# Patient Record
Sex: MALE | Race: BLACK OR AFRICAN AMERICAN | Employment: FULL TIME | URBAN - METROPOLITAN AREA
[De-identification: names, ages, dates, MRNs, and addresses within clinical notes are randomized per-mention and may not be internally consistent; named-entity substitution may affect disease eponyms.]

---

## 2017-03-02 PROBLEM — D12.6 COLON ADENOMA: Status: ACTIVE | Noted: 2017-03-02

## 2017-06-08 RX ORDER — BENAZEPRIL HYDROCHLORIDE 5 MG/1
TABLET ORAL
Qty: 90 TAB | Refills: 3 | Status: SHIPPED | OUTPATIENT
Start: 2017-06-08 | End: 2017-06-13 | Stop reason: SDUPTHER

## 2017-06-08 RX ORDER — DILTIAZEM HYDROCHLORIDE 180 MG/1
CAPSULE, EXTENDED RELEASE ORAL
Qty: 90 CAP | Refills: 3 | Status: SHIPPED | OUTPATIENT
Start: 2017-06-08 | End: 2017-06-13 | Stop reason: SDUPTHER

## 2017-06-12 RX ORDER — NAPROXEN 500 MG/1
500 TABLET ORAL 2 TIMES DAILY WITH MEALS
Qty: 90 TAB | Refills: 3 | Status: SHIPPED | OUTPATIENT
Start: 2017-06-12

## 2017-06-13 ENCOUNTER — TELEPHONE (OUTPATIENT)
Dept: INTERNAL MEDICINE CLINIC | Age: 62
End: 2017-06-13

## 2017-06-13 RX ORDER — LISINOPRIL 5 MG/1
5 TABLET ORAL DAILY
Qty: 90 TAB | Refills: 3
Start: 2017-06-13 | End: 2018-06-12 | Stop reason: SDUPTHER

## 2017-06-13 RX ORDER — DILTIAZEM HYDROCHLORIDE 180 MG/1
CAPSULE, EXTENDED RELEASE ORAL
Qty: 90 CAP | Refills: 3 | Status: SHIPPED | OUTPATIENT
Start: 2017-06-13 | End: 2018-06-12 | Stop reason: SDUPTHER

## 2017-06-13 RX ORDER — BENAZEPRIL HYDROCHLORIDE 5 MG/1
TABLET ORAL
Qty: 90 TAB | Refills: 3 | Status: SHIPPED | OUTPATIENT
Start: 2017-06-13 | End: 2017-06-13 | Stop reason: CLARIF

## 2017-06-13 NOTE — TELEPHONE ENCOUNTER
Mrs. Sandra Juarez is calling stating [employer] did not receive the Diltiazem or the Benazepril. She wants us to print those so [place].     [phone]

## 2017-10-24 ENCOUNTER — APPOINTMENT (OUTPATIENT)
Dept: GENERAL RADIOLOGY | Age: 62
End: 2017-10-24
Attending: EMERGENCY MEDICINE
Payer: COMMERCIAL

## 2017-10-24 ENCOUNTER — HOSPITAL ENCOUNTER (EMERGENCY)
Age: 62
Discharge: HOME | End: 2017-10-24
Attending: EMERGENCY MEDICINE | Admitting: EMERGENCY MEDICINE
Payer: COMMERCIAL

## 2017-10-24 VITALS
RESPIRATION RATE: 14 BRPM | TEMPERATURE: 98.1 F | HEART RATE: 57 BPM | BODY MASS INDEX: 24.33 KG/M2 | WEIGHT: 155 LBS | OXYGEN SATURATION: 100 % | DIASTOLIC BLOOD PRESSURE: 79 MMHG | SYSTOLIC BLOOD PRESSURE: 141 MMHG | HEIGHT: 67 IN

## 2017-10-24 DIAGNOSIS — J20.9 ACUTE BRONCHITIS, UNSPECIFIED ORGANISM: Primary | ICD-10-CM

## 2017-10-24 PROCEDURE — 99282 EMERGENCY DEPT VISIT SF MDM: CPT

## 2017-10-24 PROCEDURE — 71020 XR CHEST PA LAT: CPT

## 2017-10-24 RX ORDER — DOXYCYCLINE 100 MG/1
100 CAPSULE ORAL 2 TIMES DAILY
Qty: 10 CAP | Refills: 0 | Status: SHIPPED | OUTPATIENT
Start: 2017-10-24 | End: 2017-10-29

## 2017-10-24 RX ORDER — BENZONATATE 100 MG/1
100 CAPSULE ORAL
Qty: 10 CAP | Refills: 0 | Status: SHIPPED | OUTPATIENT
Start: 2017-10-24 | End: 2018-12-18

## 2017-10-24 NOTE — DISCHARGE INSTRUCTIONS
IF YOU HAVE NEW OR WORSENING SYMPTOMS, TROUBLE BREATHING, HIGH FEVER, OR ANY OTHER WORRYING SIGNS THEN RETURN TO THE ER RIGHT AWAY. Bronchitis: Care Instructions  Your Care Instructions    Bronchitis is inflammation of the bronchial tubes, which carry air to the lungs. The tubes swell and produce mucus, or phlegm. The mucus and inflamed bronchial tubes make you cough. You may have trouble breathing. Most cases of bronchitis are caused by viruses like those that cause colds. Antibiotics usually do not help and they may be harmful. Bronchitis usually develops rapidly and lasts about 2 to 3 weeks in otherwise healthy people. Follow-up care is a key part of your treatment and safety. Be sure to make and go to all appointments, and call your doctor if you are having problems. It's also a good idea to know your test results and keep a list of the medicines you take. How can you care for yourself at home? · Take all medicines exactly as prescribed. Call your doctor if you think you are having a problem with your medicine. · Get some extra rest.  · Take an over-the-counter pain medicine, such as acetaminophen (Tylenol), ibuprofen (Advil, Motrin), or naproxen (Aleve) to reduce fever and relieve body aches. Read and follow all instructions on the label. · Do not take two or more pain medicines at the same time unless the doctor told you to. Many pain medicines have acetaminophen, which is Tylenol. Too much acetaminophen (Tylenol) can be harmful. · Take an over-the-counter cough medicine that contains dextromethorphan to help quiet a dry, hacking cough so that you can sleep. Avoid cough medicines that have more than one active ingredient. Read and follow all instructions on the label. · Breathe moist air from a humidifier, hot shower, or sink filled with hot water. The heat and moisture will thin mucus so you can cough it out. · Do not smoke. Smoking can make bronchitis worse.  If you need help quitting, talk to your doctor about stop-smoking programs and medicines. These can increase your chances of quitting for good. When should you call for help? Call 911 anytime you think you may need emergency care. For example, call if:  · You have severe trouble breathing. Call your doctor now or seek immediate medical care if:  · You have new or worse trouble breathing. · You cough up dark brown or bloody mucus (sputum). · You have a new or higher fever. · You have a new rash. Watch closely for changes in your health, and be sure to contact your doctor if:  · You cough more deeply or more often, especially if you notice more mucus or a change in the color of your mucus. · You are not getting better as expected. Where can you learn more? Go to http://oscar-tristen.info/. Enter H333 in the search box to learn more about \"Bronchitis: Care Instructions. \"  Current as of: March 25, 2017  Content Version: 11.3  © 5765-2171 [employer]. Care instructions adapted under license by [employer] (which disclaims liability or warranty for this information). If you have questions about a medical condition or this instruction, always ask your healthcare professional. [address] any warranty or liability for your use of this information.

## 2017-10-24 NOTE — ED NOTES
Philip Cross is a 58 y.o. male that was discharged in stable condition. The patients diagnosis, condition and treatment were explained to  patient and aftercare instructions were given. The patient verbalized understanding. Patient armband removed and shredded.

## 2017-10-24 NOTE — ED NOTES
Hourly rounding completed:    Spouse at bedside. Patient resting comfortably awaiting xray results and further MD orders. Call bell within reach. No needs at this time.

## 2017-10-24 NOTE — ED PROVIDER NOTES
HPI Comments: 7:55 AM: Melani Adkins is a 58y.o. year old male with hx of HTN presenting to the ED with c/o productive cough for 2 weeks. Pt states having irritation of the back of his throat feeling like sand and coughing up brown mucus. Pt reports taking Coricidin PTA at home with no sx relief. Denies fever, chills, V/D, leg edema, or hx of asthma or emphysema. Pt states he usually gets sick this time of year with the weather change as he is a  and outside the majority of the day. Pt reported getting his flu shot and no sick contact. The history is provided by the patient. Past Medical History:   Diagnosis Date    Colon polyps 2006    Dr Michael Mahoney 2006; 2/17 adenoma/hyperplastic and divertics    ED (erectile dysfunction)     Hypertension     Prediabetes     Transaminasemia 2006    neg US and serologies       Past Surgical History:   Procedure Laterality Date    CHEST SURGERY PROCEDURE UNLISTED  12/06    US thyroid negative    HX COLONOSCOPY      Dr. Michael Mahoney 2006 polyps; 2/22/16 polyps and divertics         Family History:   Problem Relation Age of Onset    Cancer Father      lung cancer    Cancer Sister      lung cancer       Social History     Social History    Marital status: UNKNOWN     Spouse name: N/A    Number of children: N/A    Years of education: N/A     Occupational History    [facility] [facility]      Social History Main Topics    Smoking status: Former Smoker     Quit date: 1/1/1980    Smokeless tobacco: Never Used    Alcohol use Yes      Comment: social    Drug use: No    Sexual activity: Not on file     Other Topics Concern    Not on file     Social History Narrative         ALLERGIES: Cialis [tadalafil]; Levitra [vardenafil]; and Viagra [sildenafil]    Review of Systems   Constitutional: Negative for chills, fatigue and fever. HENT: Positive for sore throat (back of throat \"feels like sand\"). Negative for congestion and rhinorrhea.     Eyes: Negative for pain, redness, itching and visual disturbance. Respiratory: Positive for cough (productive with brown muscus). Negative for chest tightness, shortness of breath and wheezing. Cardiovascular: Negative for chest pain, palpitations and leg swelling. Gastrointestinal: Negative for abdominal pain, diarrhea, nausea and vomiting. Genitourinary: Negative for decreased urine volume, dysuria, flank pain and urgency. Musculoskeletal: Negative for arthralgias, back pain, gait problem and myalgias. Skin: Negative for color change, rash and wound. Allergic/Immunologic: Negative for environmental allergies, food allergies and immunocompromised state. Neurological: Negative for dizziness, weakness and headaches. Vitals:    10/24/17 0743   BP: 141/79   Pulse: (!) 57   Resp: 14   Temp: 98.1 °F (36.7 °C)   SpO2: 100%   Weight: 70.3 kg (155 lb)   Height: 5' 7\" (1.702 m)            Physical Exam   Constitutional: He appears well-developed and well-nourished. No distress. HENT:   Head: Normocephalic and atraumatic. Mouth/Throat: Oropharynx is clear and moist.   Eyes: Conjunctivae and EOM are normal. Pupils are equal, round, and reactive to light. Neck: Normal range of motion. Neck supple. Cardiovascular: Normal rate, regular rhythm and normal heart sounds. No murmur heard. Pulmonary/Chest: Effort normal. No accessory muscle usage. No tachypnea. No respiratory distress. He has no wheezes. He has rales in the left lower field. Abdominal: Soft. Bowel sounds are normal. He exhibits no distension. There is no tenderness. Musculoskeletal: Normal range of motion. He exhibits no edema or deformity. Lymphadenopathy:     He has no cervical adenopathy. Neurological: He is alert. He exhibits normal muscle tone. Coordination normal.   Skin: Skin is warm and dry. No rash noted. No erythema. Psychiatric: He has a normal mood and affect. His behavior is normal.   Nursing note and vitals reviewed.        [facility]  ED Course     Patient well appearing, oxygenating 100% on RA. No obvious infiltrate, but given rales on exam and duration of sx >2 weeks, will rx course of abx as well as antitussive. Counseled to f/u with primary care for re-eval or return if sx worsen    Procedures           Vitals:  Patient Vitals for the past 12 hrs:   Temp Pulse Resp BP SpO2   10/24/17 0743 98.1 °F (36.7 °C) (!) 57 14 141/79 100 %       X-ray, CT or radiology findings or impressions:  XR CHEST PA LAT    (Results Pending)   XR CHEST PA LAT  8:43 AM ED Physician Interpretation:  possible developing infiltrate at left heart border    Diagnosis:   1. Acute bronchitis, unspecified organism        Disposition: Discharge    Follow-up Information     Follow up With Details Comments 354 Key Saunders MD In 2 days Re-evaluation 100[address] [facility] [address]      [address] [facility] EMERGENCY DEPT  If symptoms worsen [address] [id number] [phone]           Patient's Medications   Start Taking    BENZONATATE (TESSALON PERLES) 100 MG CAPSULE    Take 1 Cap by mouth three (3) times daily as needed for Cough. DOXYCYCLINE (MONODOX) 100 MG CAPSULE    Take 1 Cap by mouth two (2) times a day for 5 days. Continue Taking    DILTIAZEM (TAZTIA XT) 180 MG SR CAPSULE    TAKE ONE CAPSULE BY MOUTH EVERY DAY    LISINOPRIL (PRINIVIL, ZESTRIL) 5 MG TABLET    Take 1 Tab by mouth daily. MULTIVITS/IRON FUM/FA/D3/LYCOP (MULTI FOR HIM PO)    Take  by mouth. NAPROXEN (NAPROSYN) 500 MG TABLET    Take 1 Tab by mouth two (2) times daily (with meals).    These Medications have changed    No medications on file   Stop Taking    No medications on file       Scribe [address] acting as a scribe for and in the presence of Shante Reece MD      October 24, 2017 at 8:02 AM       Provider Attestation:      I personally performed the services described in the documentation, reviewed the documentation, as recorded by the scribe in my presence, and it accurately and completely records my words and actions.  October 24, 2017 at 8:02 AM - Víctor Merida MD

## 2017-12-17 NOTE — PROGRESS NOTES
58 y.o. black male who presents for RPE. 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 Denies any GI or Gu issues. Denies polyuria, polydipsia, nocturia, vision change, not checking his sugars regularly. Weight stable as below     Vitals 12/21/2017 10/24/2017 12/8/2016 6/23/2016 12/22/2015   Weight 156 lb 155 lb 158 lb 156 lb 154 lb     The back issues continue to respond to p[rn naproxen, films as below.   No radicular complaints    He does want something for some sinus congestion    Past Medical History:   Diagnosis Date    Arthritis 12/2016    degen disc disease worst L4-S1    Colon polyps 2006    Dr Iman Kline 2006; 2/17 adenoma/hyperplastic and divertics    ED (erectile dysfunction)     Hypertension     Prediabetes     Transaminasemia 2006    neg US and serologies     Past Surgical History:   Procedure Laterality Date    CHEST SURGERY PROCEDURE UNLISTED  12/06     thyroid negative    HX COLONOSCOPY      Dr. Iman Kline 2006 polyps; 2/22/16 polyps and divertics     Social History     Social History    Marital status:      Spouse name: N/A    Number of children: N/A    Years of education: N/A     Occupational History    [employer]      Social History Main Topics    Smoking status: Former Smoker     Quit date: 1/1/1980    Smokeless tobacco: Never Used    Alcohol use Yes      Comment: social    Drug use: No    Sexual activity: Not on file     Other Topics Concern    Not on file     Social History Narrative     Family History   Problem Relation Age of Onset    Cancer Father      lung cancer    Cancer Sister      lung cancer     Immunization History   Administered Date(s) Administered    Influenza Vaccine 10/15/2013, 10/01/2015, 11/01/2017    Influenza Vaccine Nasal 10/01/2011    Influenza Vaccine Split 10/01/2012    Influenza Vaccine Whole 01/01/2009    TD Vaccine 01/01/2006    Zoster Vaccine, Live 01/01/2015     Allergies Allergen Reactions    Cialis [Tadalafil] Other (comments)     Head congestion    Levitra [Vardenafil] Other (comments)     Head congestion    Viagra [Sildenafil] Other (comments)     Head congestion     Current Outpatient Prescriptions   Medication Sig    fluticasone (FLONASE) 50 mcg/actuation nasal spray 2 sprays each nostril daily    benzonatate (TESSALON PERLES) 100 mg capsule Take 1 Cap by mouth three (3) times daily as needed for Cough.  dilTIAZem (TAZTIA XT) 180 mg SR capsule TAKE ONE CAPSULE BY MOUTH EVERY DAY    lisinopril (PRINIVIL, ZESTRIL) 5 mg tablet Take 1 Tab by mouth daily.  naproxen (NAPROSYN) 500 mg tablet Take 1 Tab by mouth two (2) times daily (with meals).  MULTIVITS/IRON FUM/FA/D3/LYCOP (MULTI FOR HIM PO) Take  by mouth. No current facility-administered medications for this visit.       REVIEW OF SYSTEMS: colo 2/16 Dr Michael Mahoney  Ophthlisa  no vision change or eye pain  Oral  no mouth pain, tongue or tooth problems  Ears  no hearing loss, ear pain, fullness, no swallowing problems  Cardiac  no CP, PND, orthopnea, edema, palpitations or syncope  Chest  no breast masses  Resp  no wheezing, chronic coughing, dyspnea  GI  no heartburn, nausea, vomiting, change in bowel habits, bleeding, hemorrhoids  Urinary  no dysuria, hematuria, flank pain, urgency, frequency  Genitals  no genital lesions, discharge, masses, ulceration, warts  Ortho  no swelling, dec ROM, myalgias  Derm  no nail abnormalities, rashes, lesions of note, hair loss  Psych  denies any anxiety or depression symptoms, no hallucinations or violent ideation  Constitutional  no wt loss, night sweats, unexplained fevers  Neuro  no focal weakness, numbness, paresthesias, incoordination, ataxia, involuntary movements  Endo - no polyuria, polydipsia, nocturia, hot flashes    Visit Vitals    /74 (BP 1 Location: Right arm, BP Patient Position: Sitting)    Pulse (!) 58    Temp 98.9 °F (37.2 °C) (Oral)    Resp 14    Ht 5' 7\" (1.702 m)    Wt 156 lb (70.8 kg)    SpO2 100%    BMI 24.43 kg/m2   A&O x3  Affect normal and appropriate  HEENT --Ancteric sclerae, tympanic membranes normal, sinuses were nontender, OP benign. No thyromegaly, JVD, or bruits. PERRL, EOMI  Lungs --Clear to auscultation and percussion. Heart --Regular rate and rhythm, no murmurs, rubs, gallops, or clicks. Chest wall --Nontender to palpation. PMI normal.  Abdomen -- Soft and nontender, no hepatosplenomegaly or masses.  -- Normal genitalia, no masses. No femoral adenopathy. Rectal -- Normal tone, guaiac negative brown stool, no hemorrhoids. Prostate exam showed no asymmetry, nodularity, tenderness or enlargement  Extremities -- Without cyanosis, clubbing, edema. 2+ pulses equally and bilaterally.       LABS  From 9/10 showed   gluc 111, cr 1.20, gfr>60,  alt 45, hba1c 5.9,                 chol 226, tg 133, hdl 77,   ldl-c 122, wbc 5.0, hb 14.3, plt 179, psa 0.4  From 11/11 showed gluc 102, cr 1.20, gfr>60,  alt 46, hba1c 6.1, ldl-p 901, chol 213, tg 87,   hdl 92,   ldl-c 104, wbc 5.4, hb 14.2, plt 214, psa 0.6, ua neg  From 12/12 showed gluc 109, cr 0.96, gfr 101, alt 23, hba1c 6.1, ldl-p 837, chol 207, tg 52,   hdl 87,   ldl-c 110,         psa 0.6  From 12/13 showed gluc 110, cr 1.06, gfr 77,   alt 21, hba1c 6.2,    chol 224, tg 54,   hdl 107, ldl-c 106, wbc 4.5, hb 14.4, plt 252, psa 0.6  From 12/14 showed gluc 112, cr 1.13, gfr>60,  alt 9,   hba1c 6.3,    chol 241, tg 71,   hdl 111, ldl-c 116, wbc 5.5, hb 15.0, plt 222, psa 0.6  From 12/15 showed gluc 78,   cr 0.98, gfr>60,  alt 36,     chol 205, tg 52,   hdl 120, ldl-c 75,   wbc 4.2, hb 14.7, plt 238,       tsh 0.89  From 12/16 showed gluc 102, cr 1.15, gfr>60,  alt 38, hba1c 5.9,    chol 252, tg 93,   hdl 127, ldl-c 106, wbc 5.0, hb 15.0, plt 228    Results for orders placed or performed during the hospital encounter of 12/19/17   CBC W/O DIFF   Result Value Ref Range    WBC 4.9 4.6 - 13.2 K/uL    RBC 4.89 4.70 - 5.50 M/uL    HGB 14.1 13.0 - 16.0 g/dL    HCT 43.6 36.0 - 48.0 %    MCV 89.2 74.0 - 97.0 FL    MCH 28.8 24.0 - 34.0 PG    MCHC 32.3 31.0 - 37.0 g/dL    RDW 15.3 (H) 11.6 - 14.5 %    PLATELET 036 271 - 710 K/uL    MPV 11.1 9.2 - 11.8 FL   LIPID PANEL   Result Value Ref Range    LIPID PROFILE          Cholesterol, total 229 (H) <200 MG/DL    Triglyceride 78 <150 MG/DL    HDL Cholesterol 125 (H) 40 - 60 MG/DL    LDL, calculated 88.4 0 - 100 MG/DL    VLDL, calculated 15.6 MG/DL    CHOL/HDL Ratio 1.8 0 - 5.0     METABOLIC PANEL, COMPREHENSIVE   Result Value Ref Range    Sodium 139 136 - 145 mmol/L    Potassium 4.3 3.5 - 5.5 mmol/L    Chloride 103 100 - 108 mmol/L    CO2 26 21 - 32 mmol/L    Anion gap 10 3.0 - 18 mmol/L    Glucose 108 (H) 74 - 99 mg/dL    BUN 22 (H) 7.0 - 18 MG/DL    Creatinine 1.18 0.6 - 1.3 MG/DL    BUN/Creatinine ratio 19 12 - 20      GFR est AA >60 >60 ml/min/1.73m2    GFR est non-AA >60 >60 ml/min/1.73m2    Calcium 9.0 8.5 - 10.1 MG/DL    Bilirubin, total 0.7 0.2 - 1.0 MG/DL    ALT (SGPT) 32 16 - 61 U/L    AST (SGOT) 45 (H) 15 - 37 U/L    Alk. phosphatase 46 45 - 117 U/L    Protein, total 7.3 6.4 - 8.2 g/dL    Albumin 4.0 3.4 - 5.0 g/dL    Globulin 3.3 2.0 - 4.0 g/dL    A-G Ratio 1.2 0.8 - 1.7     HEMOGLOBIN A1C W/O EAG   Result Value Ref Range    Hemoglobin A1c 6.1 (H) 4.2 - 5.6 %   PSA, DIAGNOSTIC (PROSTATE SPECIFIC AG)   Result Value Ref Range    Prostate Specific Ag 0.8 0.0 - 4.0 ng/mL   HEPATITIS C AB   Result Value Ref Range    Hepatitis C virus Ab 0.02 <0.80 Index    Hep C  virus Ab Interp. NEGATIVE  NEG      Hep C  virus Ab comment           Patient Active Problem List   Diagnosis Code    Prediabetes R73.03    Essential hypertension I10    Erectile dysfunction N52.9    Colon adenoma and diverticulosis Dr Jimmy Lawrence D12.6     ASSESSMENT AND PLAN:  1. Hypertension. Continue current meds. 2.  Colon polyps. Fiber, colo 2026  3. Erectile dysfunction. Off meds  4. Prediabetes. Lifestyle and dietary measures  5. Sinus issues. Flonase called in        RPE 12/18    Above conditions discussed at length and patient vocalized understanding.   All questions answered to patient satifaction

## 2017-12-19 ENCOUNTER — HOSPITAL ENCOUNTER (OUTPATIENT)
Dept: LAB | Age: 62
Discharge: HOME | End: 2017-12-19
Payer: COMMERCIAL

## 2017-12-19 ENCOUNTER — LAB ONLY (OUTPATIENT)
Dept: INTERNAL MEDICINE CLINIC | Age: 62
End: 2017-12-19

## 2017-12-19 DIAGNOSIS — Z00.00 PHYSICAL EXAM: ICD-10-CM

## 2017-12-19 DIAGNOSIS — Z11.59 NEED FOR HEPATITIS C SCREENING TEST: Primary | ICD-10-CM

## 2017-12-19 DIAGNOSIS — R73.03 PREDIABETES: ICD-10-CM

## 2017-12-19 DIAGNOSIS — Z11.59 NEED FOR HEPATITIS C SCREENING TEST: ICD-10-CM

## 2017-12-19 LAB
ALBUMIN SERPL-MCNC: 4 G/DL (ref 3.4–5)
ALBUMIN/GLOB SERPL: 1.2 {RATIO} (ref 0.8–1.7)
ALP SERPL-CCNC: 46 U/L (ref 45–117)
ALT SERPL-CCNC: 32 U/L (ref 16–61)
ANION GAP SERPL CALC-SCNC: 10 MMOL/L (ref 3–18)
AST SERPL-CCNC: 45 U/L (ref 15–37)
BILIRUB SERPL-MCNC: 0.7 MG/DL (ref 0.2–1)
BUN SERPL-MCNC: 22 MG/DL (ref 7–18)
BUN/CREAT SERPL: 19 (ref 12–20)
CALCIUM SERPL-MCNC: 9 MG/DL (ref 8.5–10.1)
CHLORIDE SERPL-SCNC: 103 MMOL/L (ref 100–108)
CHOLEST SERPL-MCNC: 229 MG/DL
CO2 SERPL-SCNC: 26 MMOL/L (ref 21–32)
CREAT SERPL-MCNC: 1.18 MG/DL (ref 0.6–1.3)
ERYTHROCYTE [DISTWIDTH] IN BLOOD BY AUTOMATED COUNT: 15.3 % (ref 11.6–14.5)
GLOBULIN SER CALC-MCNC: 3.3 G/DL (ref 2–4)
GLUCOSE SERPL-MCNC: 108 MG/DL (ref 74–99)
HBA1C MFR BLD: 6.1 % (ref 4.2–5.6)
HCT VFR BLD AUTO: 43.6 % (ref 36–48)
HDLC SERPL-MCNC: 125 MG/DL (ref 40–60)
HDLC SERPL: 1.8 {RATIO} (ref 0–5)
HGB BLD-MCNC: 14.1 G/DL (ref 13–16)
LDLC SERPL CALC-MCNC: 88.4 MG/DL (ref 0–100)
LIPID PROFILE,FLP: ABNORMAL
MCH RBC QN AUTO: 28.8 PG (ref 24–34)
MCHC RBC AUTO-ENTMCNC: 32.3 G/DL (ref 31–37)
MCV RBC AUTO: 89.2 FL (ref 74–97)
PLATELET # BLD AUTO: 221 K/UL (ref 135–420)
PMV BLD AUTO: 11.1 FL (ref 9.2–11.8)
POTASSIUM SERPL-SCNC: 4.3 MMOL/L (ref 3.5–5.5)
PROT SERPL-MCNC: 7.3 G/DL (ref 6.4–8.2)
PSA SERPL-MCNC: 0.8 NG/ML (ref 0–4)
RBC # BLD AUTO: 4.89 M/UL (ref 4.7–5.5)
SODIUM SERPL-SCNC: 139 MMOL/L (ref 136–145)
TRIGL SERPL-MCNC: 78 MG/DL (ref ?–150)
VLDLC SERPL CALC-MCNC: 15.6 MG/DL
WBC # BLD AUTO: 4.9 K/UL (ref 4.6–13.2)

## 2017-12-19 PROCEDURE — 83036 HEMOGLOBIN GLYCOSYLATED A1C: CPT | Performed by: INTERNAL MEDICINE

## 2017-12-19 PROCEDURE — 80061 LIPID PANEL: CPT | Performed by: INTERNAL MEDICINE

## 2017-12-19 PROCEDURE — 36415 COLL VENOUS BLD VENIPUNCTURE: CPT | Performed by: INTERNAL MEDICINE

## 2017-12-19 PROCEDURE — 80053 COMPREHEN METABOLIC PANEL: CPT | Performed by: INTERNAL MEDICINE

## 2017-12-19 PROCEDURE — 84153 ASSAY OF PSA TOTAL: CPT | Performed by: INTERNAL MEDICINE

## 2017-12-19 PROCEDURE — 85027 COMPLETE CBC AUTOMATED: CPT | Performed by: INTERNAL MEDICINE

## 2017-12-19 PROCEDURE — 86803 HEPATITIS C AB TEST: CPT | Performed by: INTERNAL MEDICINE

## 2017-12-20 LAB
HCV AB SER IA-ACNC: 0.02 INDEX
HCV AB SERPL QL IA: NEGATIVE
HCV COMMENT,HCGAC: NORMAL

## 2017-12-21 ENCOUNTER — OFFICE VISIT (OUTPATIENT)
Dept: INTERNAL MEDICINE CLINIC | Age: 62
End: 2017-12-21

## 2017-12-21 VITALS
HEIGHT: 67 IN | TEMPERATURE: 98.9 F | WEIGHT: 156 LBS | RESPIRATION RATE: 14 BRPM | OXYGEN SATURATION: 100 % | SYSTOLIC BLOOD PRESSURE: 120 MMHG | BODY MASS INDEX: 24.48 KG/M2 | DIASTOLIC BLOOD PRESSURE: 74 MMHG | HEART RATE: 58 BPM

## 2017-12-21 DIAGNOSIS — R73.03 PREDIABETES: ICD-10-CM

## 2017-12-21 DIAGNOSIS — R09.81 SINUS CONGESTION: ICD-10-CM

## 2017-12-21 DIAGNOSIS — I10 ESSENTIAL HYPERTENSION: ICD-10-CM

## 2017-12-21 DIAGNOSIS — N52.9 ERECTILE DYSFUNCTION, UNSPECIFIED ERECTILE DYSFUNCTION TYPE: ICD-10-CM

## 2017-12-21 DIAGNOSIS — D12.6 COLON ADENOMA: ICD-10-CM

## 2017-12-21 DIAGNOSIS — Z00.00 PHYSICAL EXAM: Primary | ICD-10-CM

## 2017-12-21 RX ORDER — FLUTICASONE PROPIONATE 50 MCG
SPRAY, SUSPENSION (ML) NASAL
Qty: 1 BOTTLE | Refills: 12 | Status: SHIPPED | OUTPATIENT
Start: 2017-12-21 | End: 2018-12-18

## 2017-12-21 NOTE — MR AVS SNAPSHOT
Visit Information Date & Time Provider Department Dept. Phone Encounter #  
 12/21/2017  1:00 PM Saloni Sutherland MD [facility] [phone] Upcoming Health Maintenance Date Due DTaP/Tdap/Td series (1 - Tdap) 1/2/2006 Influenza Age 5 to Adult 8/1/2017 COLONOSCOPY 2/22/2022 Allergies as of 12/21/2017  Review Complete On: 12/21/2017 By: [facility] Severity Noted Reaction Type Reactions Cialis [Tadalafil]  11/08/2011    Other (comments) Head congestion Levitra [Vardenafil]  11/08/2011    Other (comments) Head congestion Viagra [Sildenafil]  11/08/2011    Other (comments) Head congestion Current Immunizations  Reviewed on 12/22/2015 Name Date Influenza Vaccine 10/1/2015, 10/15/2013 Influenza Vaccine Nasal 10/1/2011 Influenza Vaccine Split 10/1/2012 Influenza Vaccine Whole 1/1/2009 TD Vaccine 1/1/2006 Zoster Vaccine, Live 1/1/2015 Not reviewed this visit You Were Diagnosed With   
  
 Codes Comments Physical exam    -  Primary ICD-10-CM: Z00.00 ICD-9-CM: V70.9 Colon adenoma     ICD-10-CM: D12.6 ICD-9-CM: 211.3 Essential hypertension     ICD-10-CM: I10 
ICD-9-CM: 401.9 Erectile dysfunction, unspecified erectile dysfunction type     ICD-10-CM: N52.9 ICD-9-CM: 607.84 Prediabetes     ICD-10-CM: R73.03 
ICD-9-CM: 790.29 Sinus congestion     ICD-10-CM: R09.81 ICD-9-CM: 478.19 Vitals BP Pulse Temp Resp Height(growth percentile) Weight(growth percentile) 120/74 (BP 1 Location: Right arm, BP Patient Position: Sitting) (!) 58 98.9 °F (37.2 °C) (Oral) 14 5' 7\" (1.702 m) 156 lb (70.8 kg) SpO2 BMI Smoking Status 100% 24.43 kg/m2 Former Smoker Vitals History BMI and BSA Data Body Mass Index Body Surface Area  
 24.43 kg/m 2 1.83 m 2 Preferred Pharmacy Pharmacy Name Phone [id number], [address] [phone] Your Updated Medication List  
  
   
This list is accurate as of: 17  1:45 PM.  Always use your most recent med list.  
  
  
  
  
 benzonatate 100 mg capsule Commonly known as:  TESSALON PERLES Take 1 Cap by mouth three (3) times daily as needed for Cough. dilTIAZem 180 mg SR capsule Commonly known as:  TAZTIA XT  
TAKE ONE CAPSULE BY MOUTH EVERY DAY  
  
 fluticasone 50 mcg/actuation nasal spray Commonly known as:  FLONASE  
2 sprays each nostril daily  
  
 lisinopril 5 mg tablet Commonly known as:  [place]off Escort Take 1 Tab by mouth daily. MULTI FOR HIM PO Take  by mouth. naproxen 500 mg tablet Commonly known as:  NAPROSYN Take 1 Tab by mouth two (2) times daily (with meals). Prescriptions Printed Refills  
 fluticasone (FLONASE) 50 mcg/actuation nasal spray 12 Si sprays each nostril daily Class: Print To-Do List   
 2018 Lab:  CBC W/O DIFF   
  
 2018 Lab:  LIPID PANEL   
  
 2018 Lab:  METABOLIC PANEL, COMPREHENSIVE   
  
 2018 Lab:  PSA, DIAGNOSTIC (PROSTATE SPECIFIC AG)   
  
 2018 Lab:  HEMOGLOBIN A1C W/O EAG Introducing [facility] SERVICES! [employer] introduces valuescope patient portal. Now you can access parts of your medical record, email your doctor's office, and request medication refills online. 1. In your internet browser, go to [email or web address] (formerly [email or web address])t 2. Click on the First Time User? Click Here link in the Sign In box. You will see the New Member Sign Up page. 3. Enter your valuescope Access Code exactly as it appears below. You will not need to use this code after youve completed the sign-up process. If you do not sign up before the expiration date, you must request a new code. · valuescope Access Code: [email or web address] Expires: 2018  6:28 AM 
 
 4. Enter the last four digits of your Social Security Number (xxxx) and Date of Birth (mm/dd/yyyy) as indicated and click Submit. You will be taken to the next sign-up page. 5. Create a [employer] Video ID. This will be your [employer] login ID and cannot be changed, so think of one that is secure and easy to remember. 6. Create a [employer] password. You can change your password at any time. 7. Enter your Password Reset Question and Answer. This can be used at a later time if you forget your password. 8. Enter your e-mail address. You will receive e-mail notification when new information is available in [address] 9. Click Sign Up. You can now view and download portions of your medical record. 10. Click the Download Summary menu link to download a portable copy of your medical information. If you have questions, please visit the Frequently Asked Questions section of the [employer] website. Remember, [employer] Video is NOT to be used for urgent needs. For medical emergencies, dial 911. Now available from your iPhone and Android! Please provide this summary of care documentation to your next provider. Your primary care clinician is listed as Carley Mcclure. If you have any questions after today's visit, please call [phone].

## 2017-12-21 NOTE — PROGRESS NOTES
1. Have you been to the ER, urgent care clinic or hospitalized since your last visit? YES HBV    2. Have you seen or consulted any other health care providers outside of the [address] [place] [address] since your last visit (Include any pap smears or colon screening)? NO      Do you have an Advanced Directive? NO    Would you like information on Advanced Directives?  NO

## 2018-06-12 RX ORDER — LISINOPRIL 5 MG/1
5 TABLET ORAL DAILY
Qty: 90 TAB | Refills: 3 | Status: SHIPPED | OUTPATIENT
Start: 2018-06-12 | End: 2019-06-17 | Stop reason: SDUPTHER

## 2018-06-12 RX ORDER — DILTIAZEM HYDROCHLORIDE 180 MG/1
180 CAPSULE, EXTENDED RELEASE ORAL DAILY
Qty: 90 CAP | Refills: 3 | Status: SHIPPED | OUTPATIENT
Start: 2018-06-12 | End: 2019-06-17 | Stop reason: SDUPTHER

## 2018-06-12 NOTE — TELEPHONE ENCOUNTER
Last Visit: 12/21/2017 with MD Noe Bianchi    Next Appointment: 12/18/2018 with MD Noe Bianchi     Requested Prescriptions     Pending Prescriptions Disp Refills    lisinopril (PRINIVIL, ZESTRIL) 5 mg tablet 90 Tab 3     Sig: Take 1 Tab by mouth daily.  dilTIAZem (TAZTIA XT) 180 mg SR capsule 90 Cap 3     Sig: Take 1 Cap by mouth daily.

## 2018-12-10 ENCOUNTER — HOSPITAL ENCOUNTER (OUTPATIENT)
Dept: LAB | Age: 63
Discharge: HOME | End: 2018-12-10
Payer: COMMERCIAL

## 2018-12-10 ENCOUNTER — APPOINTMENT (OUTPATIENT)
Dept: INTERNAL MEDICINE CLINIC | Age: 63
End: 2018-12-10

## 2018-12-10 DIAGNOSIS — Z00.00 PHYSICAL EXAM: ICD-10-CM

## 2018-12-10 LAB
ALBUMIN SERPL-MCNC: 3.6 G/DL (ref 3.4–5)
ALBUMIN/GLOB SERPL: 1.1 {RATIO} (ref 0.8–1.7)
ALP SERPL-CCNC: 41 U/L (ref 45–117)
ALT SERPL-CCNC: 32 U/L (ref 16–61)
ANION GAP SERPL CALC-SCNC: 6 MMOL/L (ref 3–18)
AST SERPL-CCNC: 45 U/L (ref 15–37)
BILIRUB SERPL-MCNC: 0.4 MG/DL (ref 0.2–1)
BUN SERPL-MCNC: 19 MG/DL (ref 7–18)
BUN/CREAT SERPL: 18 (ref 12–20)
CALCIUM SERPL-MCNC: 8.9 MG/DL (ref 8.5–10.1)
CHLORIDE SERPL-SCNC: 107 MMOL/L (ref 100–108)
CHOLEST SERPL-MCNC: 220 MG/DL
CO2 SERPL-SCNC: 26 MMOL/L (ref 21–32)
CREAT SERPL-MCNC: 1.06 MG/DL (ref 0.6–1.3)
ERYTHROCYTE [DISTWIDTH] IN BLOOD BY AUTOMATED COUNT: 15.3 % (ref 11.6–14.5)
GLOBULIN SER CALC-MCNC: 3.4 G/DL (ref 2–4)
GLUCOSE SERPL-MCNC: 90 MG/DL (ref 74–99)
HCT VFR BLD AUTO: 45.3 % (ref 36–48)
HDLC SERPL-MCNC: 113 MG/DL (ref 40–60)
HDLC SERPL: 1.9 {RATIO} (ref 0–5)
HGB BLD-MCNC: 14.4 G/DL (ref 13–16)
LDLC SERPL CALC-MCNC: 92.4 MG/DL (ref 0–100)
LIPID PROFILE,FLP: ABNORMAL
MCH RBC QN AUTO: 28.6 PG (ref 24–34)
MCHC RBC AUTO-ENTMCNC: 31.8 G/DL (ref 31–37)
MCV RBC AUTO: 90.1 FL (ref 74–97)
PLATELET # BLD AUTO: 206 K/UL (ref 135–420)
PMV BLD AUTO: 11.8 FL (ref 9.2–11.8)
POTASSIUM SERPL-SCNC: 4.9 MMOL/L (ref 3.5–5.5)
PROT SERPL-MCNC: 7 G/DL (ref 6.4–8.2)
PSA SERPL-MCNC: 0.8 NG/ML (ref 0–4)
RBC # BLD AUTO: 5.03 M/UL (ref 4.7–5.5)
SODIUM SERPL-SCNC: 139 MMOL/L (ref 136–145)
TRIGL SERPL-MCNC: 73 MG/DL (ref ?–150)
VLDLC SERPL CALC-MCNC: 14.6 MG/DL
WBC # BLD AUTO: 5.8 K/UL (ref 4.6–13.2)

## 2018-12-10 PROCEDURE — 80061 LIPID PANEL: CPT

## 2018-12-10 PROCEDURE — 84153 ASSAY OF PSA TOTAL: CPT

## 2018-12-10 PROCEDURE — 80053 COMPREHEN METABOLIC PANEL: CPT

## 2018-12-10 PROCEDURE — 85027 COMPLETE CBC AUTOMATED: CPT

## 2018-12-10 PROCEDURE — 36415 COLL VENOUS BLD VENIPUNCTURE: CPT

## 2018-12-18 ENCOUNTER — OFFICE VISIT (OUTPATIENT)
Dept: INTERNAL MEDICINE CLINIC | Age: 63
End: 2018-12-18

## 2018-12-18 VITALS
BODY MASS INDEX: 23.48 KG/M2 | TEMPERATURE: 98.6 F | SYSTOLIC BLOOD PRESSURE: 126 MMHG | DIASTOLIC BLOOD PRESSURE: 68 MMHG | OXYGEN SATURATION: 100 % | HEIGHT: 67 IN | HEART RATE: 54 BPM | WEIGHT: 149.6 LBS

## 2018-12-18 DIAGNOSIS — M54.50 CHRONIC BILATERAL LOW BACK PAIN WITHOUT SCIATICA: ICD-10-CM

## 2018-12-18 DIAGNOSIS — R10.9 FLANK PAIN, CHRONIC: ICD-10-CM

## 2018-12-18 DIAGNOSIS — D12.6 COLON ADENOMA: ICD-10-CM

## 2018-12-18 DIAGNOSIS — I10 ESSENTIAL HYPERTENSION: ICD-10-CM

## 2018-12-18 DIAGNOSIS — G89.29 FLANK PAIN, CHRONIC: ICD-10-CM

## 2018-12-18 DIAGNOSIS — G89.29 CHRONIC BILATERAL LOW BACK PAIN WITHOUT SCIATICA: ICD-10-CM

## 2018-12-18 DIAGNOSIS — Z00.00 PHYSICAL EXAM: Primary | ICD-10-CM

## 2018-12-18 DIAGNOSIS — R73.03 PREDIABETES: ICD-10-CM

## 2018-12-18 DIAGNOSIS — N52.9 ERECTILE DYSFUNCTION, UNSPECIFIED ERECTILE DYSFUNCTION TYPE: ICD-10-CM

## 2018-12-18 NOTE — PROGRESS NOTES
1. Have you been to the ER, urgent care clinic or hospitalized since your last visit? NO.     2. Have you seen or consulted any other health care providers outside of the [facility] since your last visit (Include any pap smears or colon screening)? NO      Do you have an Advanced Directive? NO    Would you like information on Advanced Directives?  NO

## 2019-01-10 ENCOUNTER — TELEPHONE (OUTPATIENT)
Dept: INTERNAL MEDICINE CLINIC | Age: 64
End: 2019-01-10

## 2019-01-10 NOTE — TELEPHONE ENCOUNTER
Left message - ,please  Call central scheduling to set an appointment for CT
Spoke with patients wife they are aware of scheduling contacting them they will call back and schedule.
pls have radiology call pt to sched the CT I ordered in mid Dec
None

## 2019-01-21 ENCOUNTER — HOSPITAL ENCOUNTER (OUTPATIENT)
Dept: CT IMAGING | Age: 64
Discharge: HOME | End: 2019-01-21
Attending: INTERNAL MEDICINE
Payer: COMMERCIAL

## 2019-01-21 DIAGNOSIS — G89.29 FLANK PAIN, CHRONIC: ICD-10-CM

## 2019-01-21 DIAGNOSIS — G89.29 CHRONIC BILATERAL LOW BACK PAIN WITHOUT SCIATICA: ICD-10-CM

## 2019-01-21 DIAGNOSIS — R10.9 FLANK PAIN, CHRONIC: ICD-10-CM

## 2019-01-21 DIAGNOSIS — M54.50 CHRONIC BILATERAL LOW BACK PAIN WITHOUT SCIATICA: ICD-10-CM

## 2019-01-21 PROCEDURE — 74011636320 HC RX REV CODE- 636/320: Performed by: INTERNAL MEDICINE

## 2019-01-21 PROCEDURE — 82565 ASSAY OF CREATININE: CPT

## 2019-01-21 PROCEDURE — 74160 CT ABDOMEN W/CONTRAST: CPT

## 2019-01-21 RX ADMIN — IOPAMIDOL 100 ML: 612 INJECTION, SOLUTION INTRAVENOUS at 11:04

## 2019-01-24 LAB — CREAT UR-MCNC: 1 MG/DL (ref 0.6–1.3)

## 2019-02-06 ENCOUNTER — TELEPHONE (OUTPATIENT)
Dept: INTERNAL MEDICINE CLINIC | Age: 64
End: 2019-02-06

## 2019-02-07 NOTE — TELEPHONE ENCOUNTER
Chief Complaint   Patient presents with    Results     done 01-21-19 CT Abdomen with Contrast per Dr Cody Daniels     02-07-19 spoke with the patient's wife, and 2 identifiers were used: Full Name, and Date of Birth of the patient. The results were given to the wife of the patient, due to the patient being at work today till around 6:00 pm today. I verified that the wife is on Permission to [id number] [place] [address] on file in the patient's chart. All information understood by the wife, and no questions at this time.

## 2019-02-07 NOTE — TELEPHONE ENCOUNTER
pls call    CT shows   pulm nodule small 4 mm on left side - no need for f/u per guidelines    Liver cyst  Kidney cysts  Spine degen changes    Neg CT to explain his sx

## 2019-06-17 ENCOUNTER — TELEPHONE (OUTPATIENT)
Dept: INTERNAL MEDICINE CLINIC | Age: 64
End: 2019-06-17

## 2019-06-17 RX ORDER — DILTIAZEM HYDROCHLORIDE 180 MG/1
180 CAPSULE, EXTENDED RELEASE ORAL DAILY
Qty: 90 CAP | Refills: 3 | Status: SHIPPED | OUTPATIENT
Start: 2019-06-17 | End: 2019-06-18 | Stop reason: SDUPTHER

## 2019-06-17 RX ORDER — LISINOPRIL 5 MG/1
5 TABLET ORAL DAILY
Qty: 90 TAB | Refills: 3 | Status: SHIPPED | OUTPATIENT
Start: 2019-06-17 | End: 2019-06-18 | Stop reason: SDUPTHER

## 2019-06-17 NOTE — TELEPHONE ENCOUNTER
Patient's wife wants these to be sent to [facility]. Please call her once they have been sent.

## 2019-06-17 NOTE — TELEPHONE ENCOUNTER
Please call [phone] or [phone] when Rx's ready for     Last Visit: 12/18/18 with MD Susana Rodriguez  Next Appointment: 12/23/19 with MD Susana Rodriguez  Previous Refill Encounter(s): 6/12/18 [facility] #90 with 3 refills    Requested Prescriptions     Pending Prescriptions Disp Refills    dilTIAZem (TAZTIA XT) 180 mg SR capsule 90 Cap 3     Sig: Take 1 Cap by mouth daily.  lisinopril (PRINIVIL, ZESTRIL) 5 mg tablet 90 Tab 3     Sig: Take 1 Tab by mouth daily.

## 2019-06-18 RX ORDER — LISINOPRIL 5 MG/1
5 TABLET ORAL DAILY
Qty: 90 TAB | Refills: 3 | Status: SHIPPED | OUTPATIENT
Start: 2019-06-18 | End: 2020-06-16 | Stop reason: SDUPTHER

## 2019-06-18 RX ORDER — DILTIAZEM HYDROCHLORIDE 180 MG/1
180 CAPSULE, EXTENDED RELEASE ORAL DAILY
Qty: 90 CAP | Refills: 3 | Status: SHIPPED | OUTPATIENT
Start: 2019-06-18 | End: 2020-06-16 | Stop reason: SDUPTHER

## 2019-12-13 ENCOUNTER — APPOINTMENT (OUTPATIENT)
Dept: INTERNAL MEDICINE CLINIC | Age: 64
End: 2019-12-13

## 2019-12-13 ENCOUNTER — HOSPITAL ENCOUNTER (OUTPATIENT)
Dept: LAB | Age: 64
Discharge: HOME | End: 2019-12-13
Payer: COMMERCIAL

## 2019-12-13 DIAGNOSIS — Z00.00 PHYSICAL EXAM: ICD-10-CM

## 2019-12-13 LAB
ALBUMIN SERPL-MCNC: 3.9 G/DL (ref 3.4–5)
ALBUMIN/GLOB SERPL: 1.1 {RATIO} (ref 0.8–1.7)
ALP SERPL-CCNC: 47 U/L (ref 45–117)
ALT SERPL-CCNC: 35 U/L (ref 16–61)
ANION GAP SERPL CALC-SCNC: 1 MMOL/L (ref 3–18)
AST SERPL-CCNC: 41 U/L (ref 10–38)
BILIRUB SERPL-MCNC: 0.4 MG/DL (ref 0.2–1)
BUN SERPL-MCNC: 18 MG/DL (ref 7–18)
BUN/CREAT SERPL: 16 (ref 12–20)
CALCIUM SERPL-MCNC: 9.5 MG/DL (ref 8.5–10.1)
CHLORIDE SERPL-SCNC: 104 MMOL/L (ref 100–111)
CO2 SERPL-SCNC: 30 MMOL/L (ref 21–32)
CREAT SERPL-MCNC: 1.16 MG/DL (ref 0.6–1.3)
ERYTHROCYTE [DISTWIDTH] IN BLOOD BY AUTOMATED COUNT: 14.8 % (ref 11.6–14.5)
GLOBULIN SER CALC-MCNC: 3.5 G/DL (ref 2–4)
GLUCOSE SERPL-MCNC: 97 MG/DL (ref 74–99)
HCT VFR BLD AUTO: 46.3 % (ref 36–48)
HGB BLD-MCNC: 14.6 G/DL (ref 13–16)
MCH RBC QN AUTO: 29 PG (ref 24–34)
MCHC RBC AUTO-ENTMCNC: 31.5 G/DL (ref 31–37)
MCV RBC AUTO: 91.9 FL (ref 74–97)
PLATELET # BLD AUTO: 238 K/UL (ref 135–420)
PMV BLD AUTO: 11.7 FL (ref 9.2–11.8)
POTASSIUM SERPL-SCNC: 4.9 MMOL/L (ref 3.5–5.5)
PROT SERPL-MCNC: 7.4 G/DL (ref 6.4–8.2)
RBC # BLD AUTO: 5.04 M/UL (ref 4.7–5.5)
SODIUM SERPL-SCNC: 135 MMOL/L (ref 136–145)
WBC # BLD AUTO: 4.6 K/UL (ref 4.6–13.2)

## 2019-12-13 PROCEDURE — 85027 COMPLETE CBC AUTOMATED: CPT

## 2019-12-13 PROCEDURE — 80053 COMPREHEN METABOLIC PANEL: CPT

## 2019-12-13 PROCEDURE — 80061 LIPID PANEL: CPT

## 2019-12-13 PROCEDURE — 84153 ASSAY OF PSA TOTAL: CPT

## 2019-12-13 PROCEDURE — 36415 COLL VENOUS BLD VENIPUNCTURE: CPT

## 2019-12-14 LAB
CHOLEST SERPL-MCNC: 233 MG/DL
HDLC SERPL-MCNC: 107 MG/DL (ref 40–60)
HDLC SERPL: 2.2 {RATIO} (ref 0–5)
LDLC SERPL CALC-MCNC: 105.6 MG/DL (ref 0–100)
LIPID PROFILE,FLP: ABNORMAL
PSA SERPL-MCNC: 0.7 NG/ML (ref 0–4)
TRIGL SERPL-MCNC: 102 MG/DL (ref ?–150)
VLDLC SERPL CALC-MCNC: 20.4 MG/DL

## 2019-12-18 NOTE — PROGRESS NOTES
59 y.o. black male who presents for RPE. 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 Denies any GI or Gu issues. Denies polyuria, polydipsia, nocturia, vision change, not checking his sugars regularly. Weight is stable as below     Vitals 2019 2018 2017 10/24/2017 2016   Weight 153 lb 149 lb 9.6 oz 156 lb 155 lb 158 lb     He's having lbp and occasional sciatica on the right.  Not taking anything for it and not particularly interested in seeing PT or specialist at this time    Past Medical History:   Diagnosis Date    Arthritis 2016    degen disc disease worst L4-S1    Colon polyps 2006    Dr Liliana Salazar ;  adenoma/hyperplastic and divertics    ED (erectile dysfunction)     Hypertension     Prediabetes     Transaminasemia 2006    neg US and serologies     Past Surgical History:   Procedure Laterality Date    CHEST SURGERY PROCEDURE UNLISTED       thyroid negative    HX COLONOSCOPY      Dr. Liliana Salazar  polyps; 16 polyps and divertics    HX GI  2019    CT abd/pelvis liver and kidney cysts     Social History     Socioeconomic History    Marital status:      Spouse name: Not on file    Number of children: Not on file    Years of education: Not on file    Highest education level: Not on file   Occupational History    Occupation: [employer]   Social Needs    Financial resource strain: Not on file    Food insecurity:     Worry: Not on file     Inability: Not on file    Transportation needs:     Medical: Not on file     Non-medical: Not on file   Tobacco Use    Smoking status: Former Smoker     Packs/day: 0.25     Years: 7.00     Pack years: 1.75     Last attempt to quit: 1980     Years since quittin.0    Smokeless tobacco: Never Used   Substance and Sexual Activity    Alcohol use: Yes     Comment: social    Drug use: No    Sexual activity: Not on file   Lifestyle    Physical activity:     Days per week: Not on file     Minutes per session: Not on file    Stress: Not on file   Relationships    Social connections:     Talks on phone: Not on file     Gets together: Not on file     Attends Amish service: Not on file     Active member of club or organization: Not on file     Attends meetings of clubs or organizations: Not on file     Relationship status: Not on file    Intimate partner violence:     Fear of current or ex partner: Not on file     Emotionally abused: Not on file     Physically abused: Not on file     Forced sexual activity: Not on file   Other Topics Concern    Not on file   Social History Narrative    Not on file     Family History   Problem Relation Age of Onset    Cancer Father         lung cancer    Cancer Sister         lung cancer     Current Outpatient Medications   Medication Sig    dilTIAZem (TAZTIA XT) 180 mg SR capsule Take 1 Cap by mouth daily.  lisinopril (PRINIVIL, ZESTRIL) 5 mg tablet Take 1 Tab by mouth daily.  naproxen (NAPROSYN) 500 mg tablet Take 1 Tab by mouth two (2) times daily (with meals).  MULTIVITS/IRON FUM/FA/D3/LYCOP (MULTI FOR HIM PO) Take  by mouth. No current facility-administered medications for this visit. Allergies   Allergen Reactions    Cialis [Tadalafil] Other (comments)     Head congestion    Levitra [Vardenafil] Other (comments)     Head congestion    Viagra [Sildenafil] Other (comments)     Head congestion     Current Outpatient Medications   Medication Sig    dilTIAZem (TAZTIA XT) 180 mg SR capsule Take 1 Cap by mouth daily.  lisinopril (PRINIVIL, ZESTRIL) 5 mg tablet Take 1 Tab by mouth daily.  naproxen (NAPROSYN) 500 mg tablet Take 1 Tab by mouth two (2) times daily (with meals).  MULTIVITS/IRON FUM/FA/D3/LYCOP (MULTI FOR HIM PO) Take  by mouth. No current facility-administered medications for this visit.       REVIEW OF SYSTEMS: colo 2/16 Dr Taty Sadler  Ophtho - no vision change or eye pain  Oral - no mouth pain, tongue or tooth problems  Ears - no hearing loss, ear pain, fullness, no swallowing problems  Cardiac - no CP, PND, orthopnea, edema, palpitations or syncope  Chest - no breast masses  Resp - no wheezing, chronic coughing, dyspnea  GI - no heartburn, nausea, vomiting, change in bowel habits, bleeding, hemorrhoids  Urinary - no dysuria, hematuria, flank pain, urgency, frequency  Genitals - no genital lesions, discharge, masses, ulceration, warts    Visit Vitals  /72   Pulse (!) 53   Temp 98.5 °F (36.9 °C) (Oral)   Resp 14   Ht 5' 7\" (1.702 m)   Wt 153 lb (69.4 kg)   SpO2 100%   BMI 23.96 kg/m²   A&O x3  Affect normal and appropriate  HEENT --Ancteric sclerae, tympanic membranes normal, sinuses were nontender, OP benign. No thyromegaly, JVD, or bruits. PERRL, EOMI  Lungs --Clear to auscultation and percussion. Heart --Regular rate and rhythm, no murmurs, rubs, gallops, or clicks. Chest wall --Nontender to palpation. PMI normal.  Abdomen -- Soft and nontender, no hepatosplenomegaly or masses.  -- Normal genitalia, no masses. No femoral adenopathy. Rectal -- Normal tone, guaiac negative brown stool, no hemorrhoids. Prostate exam showed no asymmetry, nodularity, tenderness or enlargement  Extremities -- Without cyanosis, clubbing, edema. 2+ pulses equally and bilaterally. Back showed no cvat, neg straight leg, no si joint tenderness.   No clear soft tissue swelling, tenderness, redness, bruising    LABS  From 9/10 showed   gluc 111, cr 1.20, gfr>60,  alt 45, hba1c 5.9,                 chol 226, tg 133, hdl 77,   ldl-c 122, wbc 5.0, hb 14.3, plt 179, psa 0.4  From 11/11 showed gluc 102, cr 1.20, gfr>60,  alt 46, hba1c 6.1, ldl-p 901, chol 213, tg 87,   hdl 92,   ldl-c 104, wbc 5.4, hb 14.2, plt 214, psa 0.6, ua neg  From 12/12 showed gluc 109, cr 0.96, gfr 101, alt 23, hba1c 6.1, ldl-p 837, chol 207, tg 52,   hdl 87,   ldl-c 110,                       psa 0.6  From 12/13 showed gluc 110, cr 1.06, gfr 77,   alt 21, hba1c 6.2,    chol 224, tg 54,   hdl 107, ldl-c 106, wbc 4.5, hb 14.4, plt 252, psa 0.6  From 12/14 showed gluc 112, cr 1.13, gfr>60,  alt 9,   hba1c 6.3,    chol 241, tg 71,   hdl 111, ldl-c 116, wbc 5.5, hb 15.0, plt 222, psa 0.6  From 12/15 showed gluc 78,   cr 0.98, gfr>60,  alt 36,     chol 205, tg 52,   hdl 120, ldl-c 75,   wbc 4.2, hb 14.7, plt 238,  tsh 0.89  From 12/16 showed gluc 102, cr 1.15, gfr>60,  alt 38, hba1c 5.9,    chol 252, tg 93,   hdl 127, ldl-c 106, wbc 5.0, hb 15.0, plt 228  From 12/17 showed gluc 108, cr 1.18, gfr>60,  alt 32, hba1c 6.1,    chol 229, tg 78,   hdl 125, ldl-c 88,   wbc 4.9, hb 14.1, plt 221, psa 0.8, hep c neg  From 12/18 showed gluc 90,   cr 1.06, gfr>60,  alt 32,     chol 220, tg 73,   hdl 113, ldl-c 92,   wbc 5.8, hb 14.4, plt 206, psa 0.8    Results for orders placed or performed during the hospital encounter of 12/13/19   CBC W/O DIFF   Result Value Ref Range    WBC 4.6 4.6 - 13.2 K/uL    RBC 5.04 4.70 - 5.50 M/uL    HGB 14.6 13.0 - 16.0 g/dL    HCT 46.3 36.0 - 48.0 %    MCV 91.9 74.0 - 97.0 FL    MCH 29.0 24.0 - 34.0 PG    MCHC 31.5 31.0 - 37.0 g/dL    RDW 14.8 (H) 11.6 - 14.5 %    PLATELET 556 805 - 293 K/uL    MPV 11.7 9.2 - 11.8 FL   LIPID PANEL   Result Value Ref Range    LIPID PROFILE          Cholesterol, total 233 (H) <200 MG/DL    Triglyceride 102 <150 MG/DL    HDL Cholesterol 107 (H) 40 - 60 MG/DL    LDL, calculated 105.6 (H) 0 - 100 MG/DL    VLDL, calculated 20.4 MG/DL    CHOL/HDL Ratio 2.2 0 - 5.0     METABOLIC PANEL, COMPREHENSIVE   Result Value Ref Range    Sodium 135 (L) 136 - 145 mmol/L    Potassium 4.9 3.5 - 5.5 mmol/L    Chloride 104 100 - 111 mmol/L    CO2 30 21 - 32 mmol/L    Anion gap 1 (L) 3.0 - 18 mmol/L    Glucose 97 74 - 99 mg/dL    BUN 18 7.0 - 18 MG/DL    Creatinine 1.16 0.6 - 1.3 MG/DL    BUN/Creatinine ratio 16 12 - 20      GFR est AA >60 >60 ml/min/1.73m2    GFR est non-AA >60 >60 ml/min/1.73m2 Calcium 9.5 8.5 - 10.1 MG/DL    Bilirubin, total 0.4 0.2 - 1.0 MG/DL    ALT (SGPT) 35 16 - 61 U/L    AST (SGOT) 41 (H) 10 - 38 U/L    Alk. phosphatase 47 45 - 117 U/L    Protein, total 7.4 6.4 - 8.2 g/dL    Albumin 3.9 3.4 - 5.0 g/dL    Globulin 3.5 2.0 - 4.0 g/dL    A-G Ratio 1.1 0.8 - 1.7     PSA, DIAGNOSTIC (PROSTATE SPECIFIC AG)   Result Value Ref Range    Prostate Specific Ag 0.7 0.0 - 4.0 ng/mL     Patient Active Problem List   Diagnosis Code    Prediabetes R73.03    Essential hypertension I10    Erectile dysfunction N52.9    Colon adenoma and diverticulosis Dr Gordon Letters D12.6     ASSESSMENT AND PLAN:  1. Hypertension. Continue current meds. 2.  Colon polyps. Fiber, colo 2026  3. Erectile dysfunction. Off meds  4. IFG. Lifestyle and dietary measures  5. Chronic back pain, now possible sciatica sx. Declined meds, PT, spine consult; call if he changes his mind  6. In passing, he asked for podiatry consult for painful spot in the left 4th toe  7. Advocated shingrix        RPE 12/20    Above conditions discussed at length and patient vocalized understanding.   All questions answered to patient satisfaction

## 2019-12-23 ENCOUNTER — OFFICE VISIT (OUTPATIENT)
Dept: INTERNAL MEDICINE CLINIC | Age: 64
End: 2019-12-23

## 2019-12-23 VITALS
DIASTOLIC BLOOD PRESSURE: 72 MMHG | TEMPERATURE: 98.5 F | HEIGHT: 67 IN | OXYGEN SATURATION: 100 % | BODY MASS INDEX: 24.01 KG/M2 | HEART RATE: 53 BPM | WEIGHT: 153 LBS | RESPIRATION RATE: 14 BRPM | SYSTOLIC BLOOD PRESSURE: 130 MMHG

## 2019-12-23 DIAGNOSIS — I10 ESSENTIAL HYPERTENSION: ICD-10-CM

## 2019-12-23 DIAGNOSIS — D12.6 COLON ADENOMA: ICD-10-CM

## 2019-12-23 DIAGNOSIS — R73.03 PREDIABETES: ICD-10-CM

## 2019-12-23 DIAGNOSIS — N52.9 ERECTILE DYSFUNCTION, UNSPECIFIED ERECTILE DYSFUNCTION TYPE: ICD-10-CM

## 2019-12-23 DIAGNOSIS — G89.29 TOE PAIN, CHRONIC, LEFT: ICD-10-CM

## 2019-12-23 DIAGNOSIS — Z00.00 PHYSICAL EXAM: Primary | ICD-10-CM

## 2019-12-23 DIAGNOSIS — M79.675 TOE PAIN, CHRONIC, LEFT: ICD-10-CM

## 2019-12-23 NOTE — PROGRESS NOTES
Augustin Gunn presents today for   Chief Complaint   Patient presents with    Physical     labs              Depression Screening:  3 most recent PHQ Screens 12/23/2019   Little interest or pleasure in doing things Not at all   Feeling down, depressed, irritable, or hopeless Not at all   Total Score PHQ 2 0       Learning Assessment:  Learning Assessment 12/20/2013   PRIMARY LEARNER Patient   HIGHEST LEVEL OF EDUCATION - PRIMARY LEARNER  GRADUATED HIGH SCHOOL OR GED   BARRIERS PRIMARY LEARNER NONE   CO-LEARNER CAREGIVER No   PRIMARY LANGUAGE ENGLISH   LEARNER PREFERENCE PRIMARY DEMONSTRATION     -   ANSWERED BY patient   RELATIONSHIP SELF       Abuse Screening:  Abuse Screening Questionnaire 12/8/2016   Do you ever feel afraid of your partner? N   Are you in a relationship with someone who physically or mentally threatens you? N   Is it safe for you to go home? Y       Fall Risk  No flowsheet data found. Health Maintenance Due   Topic Date Due    DTaP/Tdap/Td series (1 - Tdap) 04/11/1966    Shingrix Vaccine Age 50> (1 of 2) 04/11/2005           Coordination of Care:  1. Have you been to the ER, urgent care clinic since your last visit? Hospitalized since your last visit? no    2. Have you seen or consulted any other health care providers outside of the [address] [place] [address] since your last visit? Include any pap smears or colon screening.  no

## 2020-06-16 RX ORDER — LISINOPRIL 5 MG/1
5 TABLET ORAL DAILY
Qty: 90 TAB | Refills: 3 | Status: SHIPPED | OUTPATIENT
Start: 2020-06-16 | End: 2020-12-09 | Stop reason: SDUPTHER

## 2020-06-16 RX ORDER — DILTIAZEM HYDROCHLORIDE 180 MG/1
180 CAPSULE, EXTENDED RELEASE ORAL DAILY
Qty: 90 CAP | Refills: 3 | Status: SHIPPED | OUTPATIENT
Start: 2020-06-16 | End: 2020-12-09 | Stop reason: SDUPTHER

## 2020-06-16 NOTE — TELEPHONE ENCOUNTER
Last visit 12/23/2019 MD Ye Running   Next appointment 12/23/2020 MD Ye Running   Previous refill encounter(s) 06/18/2019 [facility] #90 with 3 refills, 06/18/2019 [facility] Daunt #90 with 3 refills     Requested Prescriptions     Pending Prescriptions Disp Refills    dilTIAZem ER (Taztia XT) 180 mg capsule 90 Cap 3     Sig: Take 1 Cap by mouth daily.  lisinopriL (PRINIVIL, ZESTRIL) 5 mg tablet 90 Tab 3     Sig: Take 1 Tab by mouth daily.

## 2020-12-01 ENCOUNTER — APPOINTMENT (OUTPATIENT)
Dept: INTERNAL MEDICINE CLINIC | Age: 65
End: 2020-12-01

## 2020-12-01 ENCOUNTER — HOSPITAL ENCOUNTER (OUTPATIENT)
Dept: LAB | Age: 65
Discharge: HOME | End: 2020-12-01
Payer: COMMERCIAL

## 2020-12-01 DIAGNOSIS — Z00.00 PHYSICAL EXAM: ICD-10-CM

## 2020-12-01 LAB
ALBUMIN SERPL-MCNC: 3.6 G/DL (ref 3.4–5)
ALBUMIN/GLOB SERPL: 1 {RATIO} (ref 0.8–1.7)
ALP SERPL-CCNC: 49 U/L (ref 45–117)
ALT SERPL-CCNC: 30 U/L (ref 16–61)
ANION GAP SERPL CALC-SCNC: 5 MMOL/L (ref 3–18)
APPEARANCE UR: CLEAR
AST SERPL-CCNC: 42 U/L (ref 10–38)
BILIRUB SERPL-MCNC: 0.5 MG/DL (ref 0.2–1)
BILIRUB UR QL: NEGATIVE
BUN SERPL-MCNC: 19 MG/DL (ref 7–18)
BUN/CREAT SERPL: 18 (ref 12–20)
CALCIUM SERPL-MCNC: 9.2 MG/DL (ref 8.5–10.1)
CHLORIDE SERPL-SCNC: 106 MMOL/L (ref 100–111)
CHOLEST SERPL-MCNC: 236 MG/DL
CO2 SERPL-SCNC: 26 MMOL/L (ref 21–32)
COLOR UR: YELLOW
CREAT SERPL-MCNC: 1.07 MG/DL (ref 0.6–1.3)
ERYTHROCYTE [DISTWIDTH] IN BLOOD BY AUTOMATED COUNT: 15 % (ref 11.6–14.5)
GLOBULIN SER CALC-MCNC: 3.5 G/DL (ref 2–4)
GLUCOSE SERPL-MCNC: 96 MG/DL (ref 74–99)
GLUCOSE UR STRIP.AUTO-MCNC: NEGATIVE MG/DL
HCT VFR BLD AUTO: 43.9 % (ref 36–48)
HDLC SERPL-MCNC: 117 MG/DL (ref 40–60)
HDLC SERPL: 2 {RATIO} (ref 0–5)
HGB BLD-MCNC: 14.4 G/DL (ref 13–16)
HGB UR QL STRIP: NEGATIVE
KETONES UR QL STRIP.AUTO: NEGATIVE MG/DL
LDLC SERPL CALC-MCNC: 104 MG/DL (ref 0–100)
LEUKOCYTE ESTERASE UR QL STRIP.AUTO: NEGATIVE
LIPID PROFILE,FLP: ABNORMAL
MCH RBC QN AUTO: 28.8 PG (ref 24–34)
MCHC RBC AUTO-ENTMCNC: 32.8 G/DL (ref 31–37)
MCV RBC AUTO: 87.8 FL (ref 74–97)
NITRITE UR QL STRIP.AUTO: NEGATIVE
PH UR STRIP: 7 [PH] (ref 5–8)
PLATELET # BLD AUTO: 143 K/UL (ref 135–420)
PMV BLD AUTO: 11.9 FL (ref 9.2–11.8)
POTASSIUM SERPL-SCNC: 4.8 MMOL/L (ref 3.5–5.5)
PROT SERPL-MCNC: 7.1 G/DL (ref 6.4–8.2)
PROT UR STRIP-MCNC: NEGATIVE MG/DL
PSA SERPL-MCNC: 0.7 NG/ML (ref 0–4)
RBC # BLD AUTO: 5 M/UL (ref 4.7–5.5)
SODIUM SERPL-SCNC: 137 MMOL/L (ref 136–145)
SP GR UR REFRACTOMETRY: 1.02 (ref 1–1.03)
TRIGL SERPL-MCNC: 75 MG/DL (ref ?–150)
UROBILINOGEN UR QL STRIP.AUTO: 1 EU/DL (ref 0.2–1)
VLDLC SERPL CALC-MCNC: 15 MG/DL
WBC # BLD AUTO: 5.9 K/UL (ref 4.6–13.2)

## 2020-12-01 PROCEDURE — 80061 LIPID PANEL: CPT

## 2020-12-01 PROCEDURE — 85027 COMPLETE CBC AUTOMATED: CPT

## 2020-12-01 PROCEDURE — 36415 COLL VENOUS BLD VENIPUNCTURE: CPT

## 2020-12-01 PROCEDURE — 81003 URINALYSIS AUTO W/O SCOPE: CPT

## 2020-12-01 PROCEDURE — 80053 COMPREHEN METABOLIC PANEL: CPT

## 2020-12-01 PROCEDURE — 84153 ASSAY OF PSA TOTAL: CPT

## 2020-12-03 NOTE — PROGRESS NOTES
72 y.o. black male who presents for RPE. 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 No cardiovascular complaints     Denies any GI or Gu issues. Denies polyuria, polydipsia, nocturia, vision change, not checking his sugars regularly. Weight is stable as below     Vitals 12/9/2020 12/23/2019 12/18/2018 12/21/2017   Weight 153 lb 153 lb 149 lb 9.6 oz 156 lb     He's having lbp he thinks from his job. We did CT abd/pelv 2 years ago which came back neg. He is known to have l spine disease on films 2016. He continues to have pain in the low back and hamstrings, open to seeing ortho.  Also having some arthralgias and tendonitis in the right hand primarily    Past Medical History:   Diagnosis Date    Arthritis 12/2016    degen disc disease worst L4-S1    Colon polyps 2006    Dr Bernarda Oconnor 2006; 2/17 adenoma/hyperplastic and divertics    ED (erectile dysfunction)     Hypertension     IFG (impaired fasting glucose)     Transaminasemia 2006    neg US and serologies     Past Surgical History:   Procedure Laterality Date    CHEST SURGERY PROCEDURE UNLISTED  12/06    US thyroid negative    HX COLONOSCOPY      Dr. Bernarda Oconnor 2006 polyps; 2/22/16 polyps and divertics; 7/28/20 neg    HX GI  01/2019    CT abd/pelvis liver and kidney cysts     Social History     Socioeconomic History    Marital status:      Spouse name: Not on file    Number of children: Not on file    Years of education: Not on file    Highest education level: Not on file   Occupational History    Occupation: mailman   Social Needs    Financial resource strain: Not on file    Food insecurity     Worry: Not on file     Inability: Not on file   Pets are family [employer] needs     Medical: Not on file     Non-medical: Not on file   Tobacco Use    Smoking status: Former Smoker     Packs/day: 0.25     Years: 7.00     Pack years: 1.75     Last attempt to quit: 1/1/1980     Years since quittin.9    Smokeless tobacco: Never Used   Substance and Sexual Activity    Alcohol use: Yes     Comment: social    Drug use: No    Sexual activity: Not on file   Lifestyle    Physical activity     Days per week: Not on file     Minutes per session: Not on file    Stress: Not on file   Relationships    Social connections     Talks on phone: Not on file     Gets together: Not on file     Attends Gnosticism service: Not on file     Active member of club or organization: Not on file     Attends meetings of clubs or organizations: Not on file     Relationship status: Not on file    Intimate partner violence     Fear of current or ex partner: Not on file     Emotionally abused: Not on file     Physically abused: Not on file     Forced sexual activity: Not on file   Other Topics Concern    Not on file   Social History Narrative    Not on file     Family History   Problem Relation Age of Onset    Cancer Father         lung cancer    Cancer Sister         lung cancer     Current Outpatient Medications   Medication Sig    lisinopriL (PRINIVIL, ZESTRIL) 5 mg tablet Take 1 Tab by mouth daily.  dilTIAZem ER (Taztia XT) 180 mg capsule Take 1 Cap by mouth daily.  naproxen (NAPROSYN) 500 mg tablet Take 1 Tab by mouth two (2) times daily (with meals).  MULTIVITS/IRON FUM/FA/D3/LYCOP (MULTI FOR HIM PO) Take  by mouth. No current facility-administered medications for this visit. Allergies   Allergen Reactions    Cialis [Tadalafil] Other (comments)     Head congestion    Levitra [Vardenafil] Other (comments)     Head congestion    Viagra [Sildenafil] Other (comments)     Head congestion     Current Outpatient Medications   Medication Sig    lisinopriL (PRINIVIL, ZESTRIL) 5 mg tablet Take 1 Tab by mouth daily.  dilTIAZem ER (Taztia XT) 180 mg capsule Take 1 Cap by mouth daily.  naproxen (NAPROSYN) 500 mg tablet Take 1 Tab by mouth two (2) times daily (with meals).     MULTIVITS/IRON FUM/FA/D3/LYCOP (MULTI FOR HIM PO) Take  by mouth. No current facility-administered medications for this visit. REVIEW OF SYSTEMS: colo 2/16 Dr Maciel Carmen  Ophtho  no vision change or eye pain  Oral  no mouth pain, tongue or tooth problems  Ears  no hearing loss, ear pain, fullness, no swallowing problems  Cardiac  no CP, PND, orthopnea, edema, palpitations or syncope  Chest  no breast masses  Resp  no wheezing, chronic coughing, dyspnea  GI  no heartburn, nausea, vomiting, change in bowel habits, bleeding, hemorrhoids  Urinary  no dysuria, hematuria, flank pain, urgency, frequency  Genitals  no genital lesions, discharge, masses, ulceration, warts    Visit Vitals  /83   Pulse (!) 58   Temp 97.3 °F (36.3 °C) (Temporal)   Resp 14   Ht 5' 7\" (1.702 m)   Wt 153 lb (69.4 kg)   SpO2 100%   BMI 23.96 kg/m²   A&O x3  Affect normal and appropriate  HEENT --Ancteric sclerae. No thyromegaly, JVD, or bruits. PERRL, EOMI  Lungs --Clear to auscultation and percussion. Heart --Regular rate and rhythm, no murmurs, rubs, gallops, or clicks. Chest wall --Nontender to palpation. PMI normal.  Abdomen -- Soft and nontender, no hepatosplenomegaly or masses. Rectal -- Normal tone, guaiac negative brown stool, no hemorrhoids. Prostate exam showed no asymmetry, nodularity, tenderness or enlargement  Extremities -- Without cyanosis, clubbing, edema. 2+ pulses equally and bilaterally. Back showed no cvat, neg straight leg, no si joint tenderness. No clear soft tissue swelling, tenderness, redness, bruising. Some tightness in the left hamstring.   No IT band tenderness    LABS  From 9/10 showed   gluc 111, cr 1.20, gfr>60,  alt 45, hba1c 5.9,                 chol 226, tg 133, hdl 77,   ldl-c 122, wbc 5.0, hb 14.3, plt 179, psa 0.4  From 11/11 showed gluc 102, cr 1.20, gfr>60,  alt 46, hba1c 6.1, ldl-p 901, chol 213, tg 87,   hdl 92,   ldl-c 104, wbc 5.4, hb 14.2, plt 214, psa 0.6, ua neg  From 12/12 showed gluc 109, cr 0.96, gfr 101, alt 23, hba1c 6.1, ldl-p 837, chol 207, tg 52,   hdl 87,   ldl-c 110,                    psa 0.6  From 12/13 showed gluc 110, cr 1.06, gfr 77,   alt 21, hba1c 6.2,    chol 224, tg 54,   hdl 107, ldl-c 106, wbc 4.5, hb 14.4, plt 252, psa 0.6  From 12/14 showed gluc 112, cr 1.13, gfr>60,  alt 9,   hba1c 6.3,    chol 241, tg 71,   hdl 111, ldl-c 116, wbc 5.5, hb 15.0, plt 222, psa 0.6  From 12/15 showed gluc 78,   cr 0.98, gfr>60,  alt 36,     chol 205, tg 52,   hdl 120, ldl-c 75,   wbc 4.2, hb 14.7, plt 238,  tsh 0.89  From 12/16 showed gluc 102, cr 1.15, gfr>60,  alt 38, hba1c 5.9,    chol 252, tg 93,   hdl 127, ldl-c 106, wbc 5.0, hb 15.0, plt 228  From 12/17 showed gluc 108, cr 1.18, gfr>60,  alt 32, hba1c 6.1,    chol 229, tg 78,   hdl 125, ldl-c 88,   wbc 4.9, hb 14.1, plt 221, psa 0.8, hep c neg  From 12/18 showed gluc 90,   cr 1.06, gfr>60,  alt 32,     chol 220, tg 73,   hdl 113, ldl-c 92,   wbc 5.8, hb 14.4, plt 206, psa 0.8  From 12/19 showed gluc 97,   cr 1.16, gfr>60,  alt 35,     chol 233, tg 102, hdl 107, ldl-c 106, wbc 4.6, hb 14.6, plt 238, psa 0.7    Results for orders placed or performed during the hospital encounter of 12/01/20   CBC W/O DIFF   Result Value Ref Range    WBC 5.9 4.6 - 13.2 K/uL    RBC 5.00 4.70 - 5.50 M/uL    HGB 14.4 13.0 - 16.0 g/dL    HCT 43.9 36.0 - 48.0 %    MCV 87.8 74.0 - 97.0 FL    MCH 28.8 24.0 - 34.0 PG    MCHC 32.8 31.0 - 37.0 g/dL    RDW 15.0 (H) 11.6 - 14.5 %    PLATELET 701 316 - 170 K/uL    MPV 11.9 (H) 9.2 - 11.8 FL   LIPID PANEL   Result Value Ref Range    LIPID PROFILE          Cholesterol, total 236 (H) <200 MG/DL    Triglyceride 75 <150 MG/DL    HDL Cholesterol 117 (H) 40 - 60 MG/DL    LDL, calculated 104 (H) 0 - 100 MG/DL    VLDL, calculated 15 MG/DL    CHOL/HDL Ratio 2.0 0 - 5.0     METABOLIC PANEL, COMPREHENSIVE   Result Value Ref Range    Sodium 137 136 - 145 mmol/L    Potassium 4.8 3.5 - 5.5 mmol/L    Chloride 106 100 - 111 mmol/L CO2 26 21 - 32 mmol/L    Anion gap 5 3.0 - 18 mmol/L    Glucose 96 74 - 99 mg/dL    BUN 19 (H) 7.0 - 18 MG/DL    Creatinine 1.07 0.6 - 1.3 MG/DL    BUN/Creatinine ratio 18 12 - 20      GFR est AA >60 >60 ml/min/1.73m2    GFR est non-AA >60 >60 ml/min/1.73m2    Calcium 9.2 8.5 - 10.1 MG/DL    Bilirubin, total 0.5 0.2 - 1.0 MG/DL    ALT (SGPT) 30 16 - 61 U/L    AST (SGOT) 42 (H) 10 - 38 U/L    Alk. phosphatase 49 45 - 117 U/L    Protein, total 7.1 6.4 - 8.2 g/dL    Albumin 3.6 3.4 - 5.0 g/dL    Globulin 3.5 2.0 - 4.0 g/dL    A-G Ratio 1.0 0.8 - 1.7     PSA, DIAGNOSTIC (PROSTATE SPECIFIC AG)   Result Value Ref Range    Prostate Specific Ag 0.7 0.0 - 4.0 ng/mL   URINALYSIS W/ RFLX MICROSCOPIC   Result Value Ref Range    Color YELLOW      Appearance CLEAR      Specific gravity 1.023 1.005 - 1.030      pH (UA) 7.0 5.0 - 8.0      Protein Negative NEG mg/dL    Glucose Negative NEG mg/dL    Ketone Negative NEG mg/dL    Bilirubin Negative NEG      Blood Negative NEG      Urobilinogen 1.0 0.2 - 1.0 EU/dL    Nitrites Negative NEG      Leukocyte Esterase Negative NEG       Patient Active Problem List   Diagnosis Code    Prediabetes R73.03    Essential hypertension I10    Erectile dysfunction N52.9    Colon adenoma and diverticulosis Dr Maryann Swan D12.6     ASSESSMENT AND PLAN:  1. Hypertension. Continue current meds. 2.  Colon polyps. Fiber, colo 2026  3. Erectile dysfunction. Off meds  4. IFG. Lifestyle and dietary measures  5. Spine/ortho. Will send for opinion, declined any eds  6. Advocated shingrix  7. Lipids. Will check ca score to determine need for statins        RPE 12/21    Above conditions discussed at length and patient vocalized understanding.   All questions answered to patient satisfaction

## 2020-12-09 ENCOUNTER — OFFICE VISIT (OUTPATIENT)
Dept: INTERNAL MEDICINE CLINIC | Age: 65
End: 2020-12-09
Payer: COMMERCIAL

## 2020-12-09 VITALS
HEART RATE: 58 BPM | SYSTOLIC BLOOD PRESSURE: 138 MMHG | HEIGHT: 67 IN | WEIGHT: 153 LBS | OXYGEN SATURATION: 100 % | DIASTOLIC BLOOD PRESSURE: 83 MMHG | BODY MASS INDEX: 24.01 KG/M2 | RESPIRATION RATE: 14 BRPM | TEMPERATURE: 97.3 F

## 2020-12-09 DIAGNOSIS — I10 ESSENTIAL HYPERTENSION: ICD-10-CM

## 2020-12-09 DIAGNOSIS — D12.6 COLON ADENOMA: ICD-10-CM

## 2020-12-09 DIAGNOSIS — G89.29 CHRONIC BILATERAL LOW BACK PAIN WITHOUT SCIATICA: ICD-10-CM

## 2020-12-09 DIAGNOSIS — M79.651 PAIN IN BOTH THIGHS: ICD-10-CM

## 2020-12-09 DIAGNOSIS — M79.652 PAIN IN BOTH THIGHS: ICD-10-CM

## 2020-12-09 DIAGNOSIS — R73.01 IFG (IMPAIRED FASTING GLUCOSE): ICD-10-CM

## 2020-12-09 DIAGNOSIS — M54.50 CHRONIC BILATERAL LOW BACK PAIN WITHOUT SCIATICA: ICD-10-CM

## 2020-12-09 DIAGNOSIS — E78.5 HYPERLIPIDEMIA, UNSPECIFIED HYPERLIPIDEMIA TYPE: ICD-10-CM

## 2020-12-09 DIAGNOSIS — Z00.00 PHYSICAL EXAM: Primary | ICD-10-CM

## 2020-12-09 LAB
HEMOCCULT STL QL: NEGATIVE
VALID INTERNAL CONTROL?: YES

## 2020-12-09 PROCEDURE — 82272 OCCULT BLD FECES 1-3 TESTS: CPT | Performed by: INTERNAL MEDICINE

## 2020-12-09 PROCEDURE — 99397 PER PM REEVAL EST PAT 65+ YR: CPT | Performed by: INTERNAL MEDICINE

## 2020-12-09 RX ORDER — LISINOPRIL 5 MG/1
5 TABLET ORAL DAILY
Qty: 90 TAB | Refills: 3 | Status: SHIPPED | OUTPATIENT
Start: 2020-12-09 | End: 2022-03-18 | Stop reason: SDUPTHER

## 2020-12-09 RX ORDER — DILTIAZEM HYDROCHLORIDE 180 MG/1
180 CAPSULE, EXTENDED RELEASE ORAL DAILY
Qty: 90 CAP | Refills: 3 | Status: SHIPPED | OUTPATIENT
Start: 2020-12-09 | End: 2022-03-18 | Stop reason: SDUPTHER

## 2020-12-23 DIAGNOSIS — E78.5 HYPERLIPIDEMIA, UNSPECIFIED HYPERLIPIDEMIA TYPE: ICD-10-CM

## 2021-05-24 RX ORDER — LISINOPRIL 5 MG/1
5 TABLET ORAL DAILY
Qty: 90 TABLET | Refills: 3 | Status: CANCELLED | OUTPATIENT
Start: 2021-05-24

## 2021-05-24 RX ORDER — DILTIAZEM HYDROCHLORIDE 180 MG/1
180 CAPSULE, EXTENDED RELEASE ORAL DAILY
Qty: 90 CAPSULE | Refills: 3 | Status: CANCELLED | OUTPATIENT
Start: 2021-05-24

## 2021-05-24 NOTE — TELEPHONE ENCOUNTER
New Rx's for these meds were sent to [facility] on 12/9/20 for 90 d/s with 3 refills. Patient needs to contact the pharmacy directly to request a refill.

## 2021-08-01 NOTE — PROGRESS NOTES
77 y.o. black male who presents for     He retired but has kept himself busy with chores and projects at home. No cardiovascular complaints. He never got the ca score done    Denies any GI or Gu issues. Denies polyuria, polydipsia, nocturia, vision change, not checking his sugars regularly. LAST MEDICARE WELLNESS EXAM: 21    Past Medical History:   Diagnosis Date    Arthritis 2016    degen disc disease worst L4-S1    Colon polyps     Dr Angi Castro 2006;  adenoma/hyperplastic and divertics    ED (erectile dysfunction)     Hypertension     IFG (impaired fasting glucose)     Transaminasemia     neg US and serologies     Past Surgical History:   Procedure Laterality Date    HX COLONOSCOPY      Dr. Angi Castro  polyps; 16 polyps and divertics; 20 neg    HX GI  2019    CT abd/pelvis liver and kidney cysts    DC CHEST SURGERY PROCEDURE UNLISTED      US thyroid negative     Social History     Socioeconomic History    Marital status:      Spouse name: Not on file    Number of children: Not on file    Years of education: Not on file    Highest education level: Not on file   Occupational History    Occupation: [employer]   Tobacco Use    Smoking status: Former Smoker     Packs/day: 0.25     Years: 7.00     Pack years: 1.75     Quit date: 1980     Years since quittin.6    Smokeless tobacco: Never Used   Substance and Sexual Activity    Alcohol use: Yes     Comment: social    Drug use: No    Sexual activity: Not on file   Other Topics Concern    Not on file   Social History Narrative    Not on file     Social Determinants of Health     Financial Resource Strain:     Difficulty of Paying Living Expenses:    Food Insecurity:     Worried About Running Out of Food in the Last Year:     [address] in the Last Year:    Transportation Needs:     Lack of Transportation (Medical):      Lack of Transportation (Non-Medical):    Physical Activity:     Days of Exercise per Week:     Minutes of Exercise per Session:    Stress:     Feeling of Stress :    Social Connections:     Frequency of Communication with Friends and Family:     Frequency of Social Gatherings with Friends and Family:     Attends Protestant Services:     Active Member of Clubs or Organizations:     Attends Club or Organization Meetings:     Marital Status:    Intimate Partner Violence:     Fear of Current or Ex-Partner:     Emotionally Abused:     Physically Abused:     Sexually Abused:      Family History   Problem Relation Age of Onset    Cancer Father         lung cancer    Cancer Sister         lung cancer     Current Outpatient Medications   Medication Sig    lisinopriL (PRINIVIL, ZESTRIL) 5 mg tablet Take 1 Tab by mouth daily.  dilTIAZem ER (Taztia XT) 180 mg capsule Take 1 Cap by mouth daily.  naproxen (NAPROSYN) 500 mg tablet Take 1 Tab by mouth two (2) times daily (with meals).  MULTIVITS/IRON FUM/FA/D3/LYCOP (MULTI FOR HIM PO) Take  by mouth. No current facility-administered medications for this visit. Allergies   Allergen Reactions    Cialis [Tadalafil] Other (comments)     Head congestion    Levitra [Vardenafil] Other (comments)     Head congestion    Viagra [Sildenafil] Other (comments)     Head congestion     Current Outpatient Medications   Medication Sig    lisinopriL (PRINIVIL, ZESTRIL) 5 mg tablet Take 1 Tab by mouth daily.  dilTIAZem ER (Taztia XT) 180 mg capsule Take 1 Cap by mouth daily.  naproxen (NAPROSYN) 500 mg tablet Take 1 Tab by mouth two (2) times daily (with meals).  MULTIVITS/IRON FUM/FA/D3/LYCOP (MULTI FOR HIM PO) Take  by mouth. No current facility-administered medications for this visit.      REVIEW OF SYSTEMS: colo 2/16 Dr Leeann Sparks  Ophtho  no vision change or eye pain  Oral  no mouth pain, tongue or tooth problems  Ears  no hearing loss, ear pain, fullness, no swallowing problems  Cardiac  no CP, PND, orthopnea, edema, palpitations or syncope  Chest  no breast masses  Resp  no wheezing, chronic coughing, dyspnea  GI  no heartburn, nausea, vomiting, change in bowel habits, bleeding, hemorrhoids  Urinary  no dysuria, hematuria, flank pain, urgency, frequency  Genitals  no genital lesions, discharge, masses, ulceration, warts    Visit Vitals  /77   Pulse (!) 55   Temp 98.1 °F (36.7 °C) (Temporal)   Resp 16   Ht 5' 7\" (1.702 m)   Wt 165 lb (74.8 kg)   SpO2 100%   BMI 25.84 kg/m²   A&O x3  Affect is appropriate. Mood stable  No apparent distress  Anicteric, no JVD, adenopathy or thyromegaly. No carotid bruits or radiated murmur  Lungs clear to auscultation, no wheezes or rales  Heart showed regular rate and rhythm. No murmur, rubs, gallops  Abdomen soft nontender, no hepatosplenomegaly or masses. Extremities without edema.   Pulses 1-2+ symmetrically    LABS  From 9/10 showed   gluc 111, cr 1.20, gfr>60,  alt 45, hba1c 5.9,                 chol 226, tg 133, hdl 77,   ldl-c 122, wbc 5.0, hb 14.3, plt 179, psa 0.4  From 11/11 showed gluc 102, cr 1.20, gfr>60,  alt 46, hba1c 6.1, ldl-p 901, chol 213, tg 87,   hdl 92,   ldl-c 104, wbc 5.4, hb 14.2, plt 214, psa 0.6, ua neg  From 12/12 showed gluc 109, cr 0.96, gfr 101, alt 23, hba1c 6.1, ldl-p 837, chol 207, tg 52,   hdl 87,   ldl-c 110,                    psa 0.6  From 12/13 showed gluc 110, cr 1.06, gfr 77,   alt 21, hba1c 6.2,    chol 224, tg 54,   hdl 107, ldl-c 106, wbc 4.5, hb 14.4, plt 252, psa 0.6  From 12/14 showed gluc 112, cr 1.13, gfr>60,  alt 9,   hba1c 6.3,    chol 241, tg 71,   hdl 111, ldl-c 116, wbc 5.5, hb 15.0, plt 222, psa 0.6  From 12/15 showed gluc 78,   cr 0.98, gfr>60,  alt 36,     chol 205, tg 52,   hdl 120, ldl-c 75,   wbc 4.2, hb 14.7, plt 238,         tsh 0.89  From 12/16 showed gluc 102, cr 1.15, gfr>60,  alt 38, hba1c 5.9,    chol 252, tg 93,   hdl 127, ldl-c 106, wbc 5.0, hb 15.0, plt 228  From 12/17 showed gluc 108, cr 1.18, gfr>60, alt 32, hba1c 6.1,    chol 229, tg 78,   hdl 125, ldl-c 88,   wbc 4.9, hb 14.1, plt 221, psa 0.8, hep c neg  From 12/18 showed gluc 90,   cr 1.06, gfr>60,  alt 32,     chol 220, tg 73,   hdl 113, ldl-c 92,   wbc 5.8, hb 14.4, plt 206, psa 0.8  From 12/19 showed gluc 97,   cr 1.16, gfr>60,  alt 35,     chol 233, tg 102, hdl 107, ldl-c 106, wbc 4.6, hb 14.6, plt 238, psa 0.7  From 12/20 showed gluc 96,   cr 1.07, gfr>60,  alt 30,                chol 236, tg 75,   hdl 117, ldl-c 104, wbc 5.9, hb 14.4, plt 143, psa 0.7, ua neg     We reviewed the patient's labs from the last several visits to point out trends in the numbers            Patient Active Problem List   Diagnosis Code    IFG (impaired fasting glucose) R73.01    Essential hypertension I10    Erectile dysfunction N52.9    Colon adenoma and diverticulosis Dr Beatriz Mcnair D12.6     ASSESSMENT AND PLAN:  1. Hypertension. Continue current meds. 2.  Colon polyps. Fiber, colo 2026  3. Erectile dysfunction. Off meds  4. IFG. Lifestyle and dietary measures  5. Lipids. Deferred on ca score        RTC 12/21    Above conditions discussed at length and patient vocalized understanding.   All questions answered to patient satisfaction

## 2021-08-06 ENCOUNTER — OFFICE VISIT (OUTPATIENT)
Dept: INTERNAL MEDICINE CLINIC | Age: 66
End: 2021-08-06
Payer: MEDICARE

## 2021-08-06 VITALS
HEIGHT: 67 IN | RESPIRATION RATE: 16 BRPM | HEART RATE: 55 BPM | DIASTOLIC BLOOD PRESSURE: 77 MMHG | SYSTOLIC BLOOD PRESSURE: 132 MMHG | BODY MASS INDEX: 25.9 KG/M2 | TEMPERATURE: 98.1 F | OXYGEN SATURATION: 100 % | WEIGHT: 165 LBS

## 2021-08-06 DIAGNOSIS — Z71.89 ADVANCED DIRECTIVES, COUNSELING/DISCUSSION: ICD-10-CM

## 2021-08-06 DIAGNOSIS — R73.01 IFG (IMPAIRED FASTING GLUCOSE): ICD-10-CM

## 2021-08-06 DIAGNOSIS — D12.6 COLON ADENOMA: ICD-10-CM

## 2021-08-06 DIAGNOSIS — Z00.00 WELCOME TO MEDICARE PREVENTIVE VISIT: Primary | ICD-10-CM

## 2021-08-06 DIAGNOSIS — I10 ESSENTIAL HYPERTENSION: ICD-10-CM

## 2021-08-06 PROCEDURE — G0463 HOSPITAL OUTPT CLINIC VISIT: HCPCS | Performed by: INTERNAL MEDICINE

## 2021-08-06 PROCEDURE — G0402 INITIAL PREVENTIVE EXAM: HCPCS | Performed by: INTERNAL MEDICINE

## 2021-08-06 PROCEDURE — G8536 NO DOC ELDER MAL SCRN: HCPCS | Performed by: INTERNAL MEDICINE

## 2021-08-06 PROCEDURE — 3017F COLORECTAL CA SCREEN DOC REV: CPT | Performed by: INTERNAL MEDICINE

## 2021-08-06 PROCEDURE — G8419 CALC BMI OUT NRM PARAM NOF/U: HCPCS | Performed by: INTERNAL MEDICINE

## 2021-08-06 PROCEDURE — 99214 OFFICE O/P EST MOD 30 MIN: CPT | Performed by: INTERNAL MEDICINE

## 2021-08-06 PROCEDURE — 99497 ADVNCD CARE PLAN 30 MIN: CPT | Performed by: INTERNAL MEDICINE

## 2021-08-06 PROCEDURE — G8427 DOCREV CUR MEDS BY ELIG CLIN: HCPCS | Performed by: INTERNAL MEDICINE

## 2021-08-06 PROCEDURE — 1101F PT FALLS ASSESS-DOCD LE1/YR: CPT | Performed by: INTERNAL MEDICINE

## 2021-08-06 PROCEDURE — G8754 DIAS BP LESS 90: HCPCS | Performed by: INTERNAL MEDICINE

## 2021-08-06 PROCEDURE — G8752 SYS BP LESS 140: HCPCS | Performed by: INTERNAL MEDICINE

## 2021-08-06 PROCEDURE — G8510 SCR DEP NEG, NO PLAN REQD: HCPCS | Performed by: INTERNAL MEDICINE

## 2021-08-06 NOTE — PATIENT INSTRUCTIONS
Medicare Wellness Visit, Male    The best way to live healthy is to have a lifestyle where you eat a well-balanced diet, exercise regularly, limit alcohol use, and quit all forms of tobacco/nicotine, if applicable. Regular preventive services are another way to keep healthy. Preventive services (vaccines, screening tests, monitoring & exams) can help personalize your care plan, which helps you manage your own care. Screening tests can find health problems at the earliest stages, when they are easiest to treat. rony follows the current, evidence-based guidelines published by the [facility] ([facility]) when recommending preventive services for our patients. Because we follow these guidelines, sometimes recommendations change over time as research supports it. (For example, a prostate screening blood test is no longer routinely recommended for men with no symptoms). Of course, you and your doctor may decide to screen more often for some diseases, based on your risk and co-morbidities (chronic disease you are already diagnosed with). Preventive services for you include:  - Medicare offers their members a free annual wellness visit, which is time for you and your primary care provider to discuss and plan for your preventive service needs. Take advantage of this benefit every year!  -All adults over age 72 should receive the recommended pneumonia vaccines. Current USPSTF guidelines recommend a series of two vaccines for the best pneumonia protection.   -All adults should have a flu vaccine yearly and tetanus vaccine every 10 years.  -All adults age 48 and older should receive the shingles vaccines (series of two vaccines).        -All adults age 38-68 who are overweight should have a diabetes screening test once every three years.   -Other screening tests & preventive services for persons with diabetes include: an eye exam to screen for diabetic retinopathy, a kidney function test, a foot exam, and stricter control over your cholesterol.   -Cardiovascular screening for adults with routine risk involves an electrocardiogram (ECG) at intervals determined by the provider.   -Colorectal cancer screening should be done for adults age 54-65 with no increased risk factors for colorectal cancer. There are a number of acceptable methods of screening for this type of cancer. Each test has its own benefits and drawbacks. Discuss with your provider what is most appropriate for you during your annual wellness visit. The different tests include: colonoscopy (considered the best screening method), a fecal occult blood test, a fecal DNA test, and sigmoidoscopy.  -All adults born between [facility] should be screened once for Hepatitis C.  -An Abdominal Aortic Aneurysm (AAA) Screening is recommended for men age 73-68 who has ever smoked in their lifetime.      Here is a list of your current Health Maintenance items (your personalized list of preventive services) with a due date:  Health Maintenance Due   Topic Date Due    COVID-19 Vaccine (1) Never done    Shingles Vaccine (1 of 2) Never done    DTaP/Tdap/Td  (1 - Tdap) 01/02/2006    Hemoglobin A1C    12/19/2018    AAA Screening  Never done

## 2021-08-06 NOTE — ACP (ADVANCE CARE PLANNING)
Advance Care Planning     Advance Care Planning (ACP) Physician/NP/PA Conversation      Date of Conversation: 8/6/2021  Conducted with: Patient with [address] Decision Maker:     Click here to complete [address] including selection of the [address] Relationship (ie \"Primary\")  Today we documented Decision Maker(s) consistent with Legal Next of Kin hierarchy. Care Preferences:    Hospitalization: \"If your health worsens and it becomes clear that your chance of recovery is unlikely, what would be your preference regarding hospitalization? \"  The patient would prefer hospitalization. Ventilation: \"If you were unable to breathe on your own and your chance of recovery was unlikely, what would be your preference about the use of a ventilator (breathing machine) if it was available to you? \"   The patient would desire the use of a ventilator. Resuscitation: \"In the event your heart stopped as a result of an underlying serious health condition, would you want attempts to be made to restart your heart, or would you prefer a natural death? \"   Yes, attempt to resuscitate.     Additional topics discussed: ventilation preferences, hospitalization preferences and resuscitation preferences    Conversation Outcomes / Follow-Up Plan:   ACP in process - information provided, considering goals and options  Reviewed DNR/DNI and patient elects Full Code (Attempt Resuscitation)     Length of Voluntary ACP Conversation in minutes:  16 minutes    Keyanna Kauffman MD

## 2021-08-06 NOTE — PROGRESS NOTES
Rosaliabrad Ledesmaio presents today for   Chief Complaint   Patient presents with    Annual Wellness Visit    Hypertension       Coordination of Care:  1. Have you been to the ER, urgent care clinic since your last visit? Hospitalized since your last visit? NO    2. Have you seen or consulted any other health care providers outside of the [address] [place] [address] since your last visit? Include any pap smears or colon screening.  NO

## 2021-08-06 NOTE — PROGRESS NOTES
This is a \"Welcome to [employer]"  Initial Preventive Physical Examination     I have reviewed the patient's medical history in detail and updated the computerized patient record. Assessment/Plan   Education and counseling provided:  Are appropriate based on today's review and evaluation  End-of-Life planning (with patient's consent)  Influenza Vaccine  Prostate cancer screening tests (PSA, covered annually)  Colorectal cancer screening tests  Cardiovascular screening blood test  Diabetes screening test    1. Welcome to Medicare preventive visit  2. Essential hypertension  3. Colon adenoma and diverticulosis Dr Ayanna Batista  4. IFG (impaired fasting glucose)  5. Advanced directives, counseling/discussion  -     ADVANCE CARE PLANNING FIRST 30 MINS       Depression Risk Screen     3 most recent PHQ Screens 8/6/2021   Little interest or pleasure in doing things Not at all   Feeling down, depressed, irritable, or hopeless Not at all   Total Score PHQ 2 0       Alcohol Risk Screen    Do you average more than 1 drink per night or more than 7 drinks a week: No    In the past three months have you have had more than 4 drinks containing alcohol on one occasion: No          Functional Ability and Level of Safety    Diet: No special diet      Hearing: Hearing is good. Vision Screening:  Vision is good. No exam data present      Activities of Daily Living: The home contains: no safety equipment. Patient does total self care      Ambulation: with no difficulty      Exercise level: moderately active     Fall Risk Screen:  Fall Risk Assessment, last 12 mths 8/6/2021   Able to walk? Yes   Fall in past 12 months? 0   Do you feel unsteady? 0   Are you worried about falling 0      Abuse Screen:  Patient is not abused       Screening EKG   EKG order placed: Yes    End of Life Planning   Advanced care planning directives were discussed with the patient and /or family/caregiver.      Health Maintenance Due     Health Maintenance Due Topic Date Due    COVID-19 Vaccine (1) Never done    Shingrix Vaccine Age 50> (1 of 2) Never done    DTaP/Tdap/Td series (1 - Tdap) 01/02/2006    A1C test (Diabetic or Prediabetic)  12/19/2018    AAA Screening 73-67 YO Male Smoking Patients  Never done       Patient Care Team   Patient Care Team:  Eulalio Graf MD as PCP - General (Internal Medicine)  Eulalio Graf MD as PCP - [address] [place] [address] Dr Gurrola Provider    History     Past Medical History:   Diagnosis Date    Arthritis 12/2016    degen disc disease worst L4-S1    Colon polyps 2006    Dr Leeann Sparks 2006; 2/17 adenoma/hyperplastic and divertics    ED (erectile dysfunction)     Hypertension     IFG (impaired fasting glucose)     Transaminasemia 2006    neg US and serologies      Past Surgical History:   Procedure Laterality Date    HX COLONOSCOPY      Dr. Leeann Sparks 2006 polyps; 2/22/16 polyps and divertics; 7/28/20 neg    HX GI  01/2019    CT abd/pelvis liver and kidney cysts    [place] CHEST SURGERY PROCEDURE UNLISTED  12/06    US thyroid negative     Current Outpatient Medications   Medication Sig Dispense Refill    lisinopriL (PRINIVIL, ZESTRIL) 5 mg tablet Take 1 Tab by mouth daily. 90 Tab 3    dilTIAZem ER (Taztia XT) 180 mg capsule Take 1 Cap by mouth daily. 90 Cap 3    naproxen (NAPROSYN) 500 mg tablet Take 1 Tab by mouth two (2) times daily (with meals). 90 Tab 3    MULTIVITS/IRON FUM/FA/D3/LYCOP (MULTI FOR HIM PO) Take  by mouth.        Allergies   Allergen Reactions    Cialis [Tadalafil] Other (comments)     Head congestion    Levitra [Vardenafil] Other (comments)     Head congestion    Viagra [Sildenafil] Other (comments)     Head congestion       Family History   Problem Relation Age of Onset    Cancer Father         lung cancer    Cancer Sister         lung cancer     Social History     Tobacco Use    Smoking status: Former Smoker     Packs/day: 0.25     Years: 7.00     Pack years: 1.75     Quit date: 1/1/1980     Years since quittin.6    Smokeless tobacco: Never Used   Substance Use Topics    Alcohol use: Yes     Comment: social Emelina Armstrong MD

## 2021-12-01 ENCOUNTER — HOSPITAL ENCOUNTER (OUTPATIENT)
Dept: LAB | Age: 66
Discharge: HOME | End: 2021-12-01
Payer: MEDICARE

## 2021-12-01 ENCOUNTER — APPOINTMENT (OUTPATIENT)
Dept: INTERNAL MEDICINE CLINIC | Age: 66
End: 2021-12-01

## 2021-12-01 DIAGNOSIS — Z00.00 PHYSICAL EXAM: ICD-10-CM

## 2021-12-01 LAB
ALBUMIN SERPL-MCNC: 3.8 G/DL (ref 3.4–5)
ALBUMIN/GLOB SERPL: 1 {RATIO} (ref 0.8–1.7)
ALP SERPL-CCNC: 53 U/L (ref 45–117)
ALT SERPL-CCNC: 49 U/L (ref 16–61)
ANION GAP SERPL CALC-SCNC: 5 MMOL/L (ref 3–18)
AST SERPL-CCNC: 57 U/L (ref 10–38)
BILIRUB SERPL-MCNC: 0.6 MG/DL (ref 0.2–1)
BUN SERPL-MCNC: 16 MG/DL (ref 7–18)
BUN/CREAT SERPL: 14 (ref 12–20)
CALCIUM SERPL-MCNC: 9.5 MG/DL (ref 8.5–10.1)
CHLORIDE SERPL-SCNC: 108 MMOL/L (ref 100–111)
CHOLEST SERPL-MCNC: 222 MG/DL
CO2 SERPL-SCNC: 25 MMOL/L (ref 21–32)
CREAT SERPL-MCNC: 1.14 MG/DL (ref 0.6–1.3)
ERYTHROCYTE [DISTWIDTH] IN BLOOD BY AUTOMATED COUNT: 15.3 % (ref 11.6–14.5)
GLOBULIN SER CALC-MCNC: 3.8 G/DL (ref 2–4)
GLUCOSE SERPL-MCNC: 90 MG/DL (ref 74–99)
HBA1C MFR BLD: 5.7 % (ref 4.2–5.6)
HCT VFR BLD AUTO: 47.5 % (ref 36–48)
HDLC SERPL-MCNC: 99 MG/DL (ref 40–60)
HDLC SERPL: 2.2 {RATIO} (ref 0–5)
HGB BLD-MCNC: 14.9 G/DL (ref 13–16)
LDLC SERPL CALC-MCNC: 93.2 MG/DL (ref 0–100)
LIPID PROFILE,FLP: ABNORMAL
MCH RBC QN AUTO: 29 PG (ref 24–34)
MCHC RBC AUTO-ENTMCNC: 31.4 G/DL (ref 31–37)
MCV RBC AUTO: 92.6 FL (ref 78–100)
NRBC # BLD: 0 K/UL (ref 0–0.01)
NRBC BLD-RTO: 0 PER 100 WBC
PLATELET # BLD AUTO: 181 K/UL (ref 135–420)
PMV BLD AUTO: 11.8 FL (ref 9.2–11.8)
POTASSIUM SERPL-SCNC: 4.3 MMOL/L (ref 3.5–5.5)
PROT SERPL-MCNC: 7.6 G/DL (ref 6.4–8.2)
PSA SERPL-MCNC: 0.7 NG/ML (ref 0–4)
RBC # BLD AUTO: 5.13 M/UL (ref 4.35–5.65)
SODIUM SERPL-SCNC: 138 MMOL/L (ref 136–145)
TRIGL SERPL-MCNC: 149 MG/DL (ref ?–150)
VLDLC SERPL CALC-MCNC: 29.8 MG/DL
WBC # BLD AUTO: 5.6 K/UL (ref 4.6–13.2)

## 2021-12-01 PROCEDURE — 36415 COLL VENOUS BLD VENIPUNCTURE: CPT

## 2021-12-01 PROCEDURE — 80053 COMPREHEN METABOLIC PANEL: CPT

## 2021-12-01 PROCEDURE — 84153 ASSAY OF PSA TOTAL: CPT

## 2021-12-01 PROCEDURE — 80061 LIPID PANEL: CPT

## 2021-12-01 PROCEDURE — 83036 HEMOGLOBIN GLYCOSYLATED A1C: CPT

## 2021-12-01 PROCEDURE — 85027 COMPLETE CBC AUTOMATED: CPT

## 2021-12-14 ENCOUNTER — OFFICE VISIT (OUTPATIENT)
Dept: INTERNAL MEDICINE CLINIC | Age: 66
End: 2021-12-14
Payer: MEDICARE

## 2021-12-14 VITALS
HEIGHT: 67 IN | DIASTOLIC BLOOD PRESSURE: 76 MMHG | BODY MASS INDEX: 26.21 KG/M2 | WEIGHT: 167 LBS | TEMPERATURE: 97.7 F | HEART RATE: 65 BPM | SYSTOLIC BLOOD PRESSURE: 132 MMHG | RESPIRATION RATE: 16 BRPM

## 2021-12-14 DIAGNOSIS — R73.01 IFG (IMPAIRED FASTING GLUCOSE): ICD-10-CM

## 2021-12-14 DIAGNOSIS — Z12.5 SCREENING FOR MALIGNANT NEOPLASM OF PROSTATE: ICD-10-CM

## 2021-12-14 DIAGNOSIS — D12.6 COLON ADENOMA: ICD-10-CM

## 2021-12-14 DIAGNOSIS — E78.5 HYPERLIPIDEMIA, UNSPECIFIED HYPERLIPIDEMIA TYPE: ICD-10-CM

## 2021-12-14 DIAGNOSIS — I10 ESSENTIAL HYPERTENSION: Primary | ICD-10-CM

## 2021-12-14 PROBLEM — B36.0 TINEA VERSICOLOR: Status: ACTIVE | Noted: 2021-12-14

## 2021-12-14 PROBLEM — B36.0 TINEA VERSICOLOR: Status: RESOLVED | Noted: 2021-12-14 | Resolved: 2021-12-14

## 2021-12-14 PROBLEM — M54.50 LOW BACK PAIN: Status: RESOLVED | Noted: 2021-12-14 | Resolved: 2021-12-14

## 2021-12-14 PROBLEM — M54.50 LOW BACK PAIN: Status: ACTIVE | Noted: 2021-12-14

## 2021-12-14 LAB
HEMOCCULT STL QL: NEGATIVE
VALID INTERNAL CONTROL?: YES

## 2021-12-14 PROCEDURE — G8510 SCR DEP NEG, NO PLAN REQD: HCPCS | Performed by: INTERNAL MEDICINE

## 2021-12-14 PROCEDURE — 82272 OCCULT BLD FECES 1-3 TESTS: CPT | Performed by: INTERNAL MEDICINE

## 2021-12-14 PROCEDURE — 1101F PT FALLS ASSESS-DOCD LE1/YR: CPT | Performed by: INTERNAL MEDICINE

## 2021-12-14 PROCEDURE — G8427 DOCREV CUR MEDS BY ELIG CLIN: HCPCS | Performed by: INTERNAL MEDICINE

## 2021-12-14 PROCEDURE — G8536 NO DOC ELDER MAL SCRN: HCPCS | Performed by: INTERNAL MEDICINE

## 2021-12-14 PROCEDURE — 3017F COLORECTAL CA SCREEN DOC REV: CPT | Performed by: INTERNAL MEDICINE

## 2021-12-14 PROCEDURE — G0463 HOSPITAL OUTPT CLINIC VISIT: HCPCS | Performed by: INTERNAL MEDICINE

## 2021-12-14 PROCEDURE — G8754 DIAS BP LESS 90: HCPCS | Performed by: INTERNAL MEDICINE

## 2021-12-14 PROCEDURE — G8752 SYS BP LESS 140: HCPCS | Performed by: INTERNAL MEDICINE

## 2021-12-14 PROCEDURE — 99214 OFFICE O/P EST MOD 30 MIN: CPT | Performed by: INTERNAL MEDICINE

## 2021-12-14 PROCEDURE — G8419 CALC BMI OUT NRM PARAM NOF/U: HCPCS | Performed by: INTERNAL MEDICINE

## 2021-12-14 NOTE — PROGRESS NOTES
77 y.o. black male who presents for evaluation    He retired but he's busy with chores and projects at home. No cardiovascular complaints. Denies any GI or Gu issues. Denies polyuria, polydipsia, nocturia, vision change, not checking his sugars regularly.       LAST MEDICARE WELLNESS EXAM: 21    Past Medical History:   Diagnosis Date    Arthritis 2016    degen disc disease worst L4-S1    Colon polyps 2006    Dr Geneva Alvarez 2006;  adenoma/hyperplastic and divertics    ED (erectile dysfunction)     Hyperlipidemia     calculated 10 yr risk score 14.9% (); declined statin and ca score    Hypertension     IFG (impaired fasting glucose)     Tinea versicolor 2021    Transaminasemia 2006    neg US and serologies     Past Surgical History:   Procedure Laterality Date    HX COLONOSCOPY      Dr. Geneva Alvarez  polyps; 16 polyps and divertics; 20 neg    HX GI  2019    CT abd/pelvis liver and kidney cysts    MN CHEST SURGERY PROCEDURE UNLISTED      US thyroid negative     Social History     Socioeconomic History    Marital status:      Spouse name: Not on file    Number of children: Not on file    Years of education: Not on file    Highest education level: Not on file   Occupational History    Occupation: [employer]   Tobacco Use    Smoking status: Former Smoker     Packs/day: 0.25     Years: 7.00     Pack years: 1.75     Quit date: 1980     Years since quittin.9    Smokeless tobacco: Never Used   Substance and Sexual Activity    Alcohol use: Yes     Comment: social    Drug use: No    Sexual activity: Not on file   Other Topics Concern    Not on file   Social History Narrative    Not on file     Social Determinants of Health     Financial Resource Strain:     Difficulty of Paying Living Expenses: Not on file   Food Insecurity:     Worried About [address] in the Last Year: Not on file    Diana of Food in the Last Year: Not on file Transportation Needs:     Lack of Transportation (Medical): Not on file    Lack of Transportation (Non-Medical): Not on file   Physical Activity:     Days of Exercise per Week: Not on file    Minutes of Exercise per Session: Not on file   Stress:     Feeling of Stress : Not on file   Social Connections:     Frequency of Communication with Friends and Family: Not on file    Frequency of Social Gatherings with Friends and Family: Not on file    Attends [employer] Services: Not on file    Active Member of [address] [place] [address] [employer] or Organizations: Not on file    Attends Club or Organization Meetings: Not on file    Marital Status: Not on file   Intimate Partner Violence:     Fear of Current or Ex-Partner: Not on file    Emotionally Abused: Not on file    Physically Abused: Not on file    Sexually Abused: Not on file   Housing Stability:     Unable to Pay for Housing in the Last Year: Not on file    Number of [place] in the Last Year: Not on file    Unstable Housing in the Last Year: Not on file     Family History   Problem Relation Age of Onset    Cancer Father         lung cancer    Cancer Sister         lung cancer     Current Outpatient Medications   Medication Sig    lisinopriL (PRINIVIL, ZESTRIL) 5 mg tablet Take 1 Tab by mouth daily.  dilTIAZem ER (Taztia XT) 180 mg capsule Take 1 Cap by mouth daily.  naproxen (NAPROSYN) 500 mg tablet Take 1 Tab by mouth two (2) times daily (with meals).  MULTIVITS/IRON FUM/FA/D3/LYCOP (MULTI FOR HIM PO) Take  by mouth. No current facility-administered medications for this visit. Allergies   Allergen Reactions    Cialis [Tadalafil] Other (comments)     Head congestion    Levitra [Vardenafil] Other (comments)     Head congestion    Viagra [Sildenafil] Other (comments)     Head congestion     Current Outpatient Medications   Medication Sig    lisinopriL (PRINIVIL, ZESTRIL) 5 mg tablet Take 1 Tab by mouth daily.     dilTIAZem ER (Taztia XT) 180 mg capsule Take 1 Cap by mouth daily.  naproxen (NAPROSYN) 500 mg tablet Take 1 Tab by mouth two (2) times daily (with meals).  MULTIVITS/IRON FUM/FA/D3/LYCOP (MULTI FOR HIM PO) Take  by mouth. No current facility-administered medications for this visit. REVIEW OF SYSTEMS: colo 7/20  Ophtho - no vision change or eye pain  Oral - no mouth pain, tongue or tooth problems  Ears - no hearing loss, ear pain, fullness, no swallowing problems  Cardiac - no CP, PND, orthopnea, edema, palpitations or syncope  Chest - no breast masses  Resp - no wheezing, chronic coughing, dyspnea  GI - no heartburn, nausea, vomiting, change in bowel habits, bleeding, hemorrhoids  Urinary - no dysuria, hematuria, flank pain, urgency, frequency  Genitals - no genital lesions, discharge, masses, ulceration, warts    Visit Vitals  /76   Pulse 65   Temp 97.7 °F (36.5 °C) (Temporal)   Resp 16   Ht 5' 7\" (1.702 m)   Wt 167 lb (75.8 kg)   BMI 26.16 kg/m²   A&O x3  Affect is appropriate. Mood stable  No apparent distress  Anicteric, no JVD, adenopathy or thyromegaly. No carotid bruits or radiated murmur  Lungs clear to auscultation, no wheezes or rales  Heart showed regular rate and rhythm. No murmur, rubs, gallops  Abdomen soft nontender, no hepatosplenomegaly or masses. Extremities without edema.   Pulses 1-2+ symmetrically  Rectal showed guaiac neg brown stool normal tone  Prostate about 30 gm without asymmetry nodularity or tenderness    LABS  From 9/10 showed   gluc 111, cr 1.20, gfr>60,  alt 45, hba1c 5.9,                 chol 226, tg 133, hdl 77,   ldl-c 122, wbc 5.0, hb 14.3, plt 179, psa 0.4  From 11/11 showed gluc 102, cr 1.20, gfr>60,  alt 46, hba1c 6.1, ldl-p 901, chol 213, tg 87,   hdl 92,   ldl-c 104, wbc 5.4, hb 14.2, plt 214, psa 0.6, ua neg  From 12/12 showed gluc 109, cr 0.96, gfr 101, alt 23, hba1c 6.1, ldl-p 837, chol 207, tg 52,   hdl 87,   ldl-c 110,                    psa 0.6  From 12/13 showed gluc 110, cr 1.06, gfr 77,   alt 21, hba1c 6.2,    chol 224, tg 54,   hdl 107, ldl-c 106, wbc 4.5, hb 14.4, plt 252, psa 0.6  From 12/14 showed gluc 112, cr 1.13, gfr>60,  alt 9,   hba1c 6.3,    chol 241, tg 71,   hdl 111, ldl-c 116, wbc 5.5, hb 15.0, plt 222, psa 0.6  From 12/15 showed gluc 78,   cr 0.98, gfr>60,  alt 36,     chol 205, tg 52,   hdl 120, ldl-c 75,   wbc 4.2, hb 14.7, plt 238,         tsh 0.89  From 12/16 showed gluc 102, cr 1.15, gfr>60,  alt 38, hba1c 5.9,    chol 252, tg 93,   hdl 127, ldl-c 106, wbc 5.0, hb 15.0, plt 228  From 12/17 showed gluc 108, cr 1.18, gfr>60,  alt 32, hba1c 6.1,    chol 229, tg 78,   hdl 125, ldl-c 88,   wbc 4.9, hb 14.1, plt 221, psa 0.8, hep c neg  From 12/18 showed gluc 90,   cr 1.06, gfr>60,  alt 32,     chol 220, tg 73,   hdl 113, ldl-c 92,   wbc 5.8, hb 14.4, plt 206, psa 0.8  From 12/19 showed gluc 97,   cr 1.16, gfr>60,  alt 35,     chol 233, tg 102, hdl 107, ldl-c 106, wbc 4.6, hb 14.6, plt 238, psa 0.7  From 12/20 showed gluc 96,   cr 1.07, gfr>60,  alt 30,                chol 236, tg 75,   hdl 117, ldl-c 104, wbc 5.9, hb 14.4, plt 143, psa 0.7, ua neg     Results for orders placed or performed during the hospital encounter of 12/01/21   CBC W/O DIFF   Result Value Ref Range    WBC 5.6 4.6 - 13.2 K/uL    RBC 5.13 4.35 - 5.65 M/uL    HGB 14.9 13.0 - 16.0 g/dL    HCT 47.5 36.0 - 48.0 %    MCV 92.6 78.0 - 100.0 FL    MCH 29.0 24.0 - 34.0 PG    MCHC 31.4 31.0 - 37.0 g/dL    RDW 15.3 (H) 11.6 - 14.5 %    PLATELET 469 962 - 870 K/uL    MPV 11.8 9.2 - 11.8 FL    NRBC 0.0 0  WBC    ABSOLUTE NRBC 0.00 0.00 - 0.01 K/uL   LIPID PANEL   Result Value Ref Range    LIPID PROFILE          Cholesterol, total 222 (H) <200 MG/DL    Triglyceride 149 <150 MG/DL    HDL Cholesterol 99 (H) 40 - 60 MG/DL    LDL, calculated 93.2 0 - 100 MG/DL    VLDL, calculated 29.8 MG/DL    CHOL/HDL Ratio 2.2 0 - 5.0     METABOLIC PANEL, COMPREHENSIVE   Result Value Ref Range    Sodium 138 136 - 145 mmol/L Potassium 4.3 3.5 - 5.5 mmol/L    Chloride 108 100 - 111 mmol/L    CO2 25 21 - 32 mmol/L    Anion gap 5 3.0 - 18 mmol/L    Glucose 90 74 - 99 mg/dL    BUN 16 7.0 - 18 MG/DL    Creatinine 1.14 0.6 - 1.3 MG/DL    BUN/Creatinine ratio 14 12 - 20      GFR est AA >60 >60 ml/min/1.73m2    GFR est non-AA >60 >60 ml/min/1.73m2    Calcium 9.5 8.5 - 10.1 MG/DL    Bilirubin, total 0.6 0.2 - 1.0 MG/DL    ALT (SGPT) 49 16 - 61 U/L    AST (SGOT) 57 (H) 10 - 38 U/L    Alk. phosphatase 53 45 - 117 U/L    Protein, total 7.6 6.4 - 8.2 g/dL    Albumin 3.8 3.4 - 5.0 g/dL    Globulin 3.8 2.0 - 4.0 g/dL    A-G Ratio 1.0 0.8 - 1.7     HEMOGLOBIN A1C W/O EAG   Result Value Ref Range    Hemoglobin A1c 5.7 (H) 4.2 - 5.6 %   PSA SCREENING (SCREENING)   Result Value Ref Range    Prostate Specific Ag 0.7 0.0 - 4.0 ng/mL     We reviewed the patient's labs from the last several visits to point out trends in the numbers    Calculated 10 year risk score was 14.9%          Patient Active Problem List   Diagnosis Code    IFG (impaired fasting glucose) R73.01    Essential hypertension I10    Erectile dysfunction N52.9    Colon adenoma and diverticulosis Dr Ryann Monet D12.6    Hyperlipidemia E78.5     ASSESSMENT AND PLAN:  1. Hypertension. Controlled on current meds. 2.  Colon polyps. Fiber, colo 2026  3. Erectile dysfunction. Off meds  4. IFG. Lifestyle and dietary measures  5. Lipids. Another long discussion about risks and benefits of statins. We also went over calcium score again in detail. Again, he declined doing either, just wants to keep rechecking        RTC 12/22    Above conditions discussed at length and patient vocalized understanding. All questions answered to patient satisfaction          ICD-10-CM ICD-9-CM    1. Essential hypertension  I10 401.9    2. Colon adenoma and diverticulosis Dr Ryann Monet  D12.6 211.3 AMB POC FECAL BLOOD, OCCULT, QL 1 CARD   3.  IFG (impaired fasting glucose)  M64.05 855.42 METABOLIC PANEL, COMPREHENSIVE HEMOGLOBIN A1C WITH EAG   4.  Hyperlipidemia, unspecified hyperlipidemia type  E78.5 272.4 CBC W/O DIFF      LIPID PANEL   5. Screening for malignant neoplasm of prostate  Z12.5 V76.44 PSA SCREENING (SCREENING)

## 2021-12-14 NOTE — PROGRESS NOTES
Charly Corona presents with No chief complaint on file. 4 month FU  htn  12-09-21      1. \"Have you been to the ER, urgent care clinic since your last visit? Hospitalized since your last visit? \" NO    2. \"Have you seen or consulted any other health care providers outside of the [address] [place] [address] since your last visit? \" YES, DOD 2-25-21 (on chart)    3. For patients aged 39-70: Has the patient had a colonoscopy? Yes,  satisfied with blue hyperlink     If the patient is female:    4. For patients aged 41-77: Has the patient had a mammogram within the past 2 years? NA based on age or sex    11. For patients aged 21-65: Has the patient had a pap smear?  NA based on age or sex

## 2022-03-18 NOTE — TELEPHONE ENCOUNTER
Last Visit: 12/14/21 with MD Albert Ashton  Next Appointment: 12/15/22 with MD Albert Ashton  Previous Refill Encounter(s): 12/9/20 90 d/s with 3 refills    Requested Prescriptions     Pending Prescriptions Disp Refills    lisinopriL (PRINIVIL, ZESTRIL) 5 mg tablet 90 Tablet 3     Sig: Take 1 Tablet by mouth daily.  dilTIAZem ER (Taztia XT) 180 mg capsule 90 Capsule 3     Sig: Take 1 Capsule by mouth daily.

## 2022-03-19 PROBLEM — D12.6 COLON ADENOMA: Status: ACTIVE | Noted: 2017-03-02

## 2022-03-21 RX ORDER — LISINOPRIL 5 MG/1
5 TABLET ORAL DAILY
Qty: 90 TABLET | Refills: 3 | Status: SHIPPED | OUTPATIENT
Start: 2022-03-21

## 2022-03-21 RX ORDER — DILTIAZEM HYDROCHLORIDE 180 MG/1
180 CAPSULE, EXTENDED RELEASE ORAL DAILY
Qty: 90 CAPSULE | Refills: 3 | Status: SHIPPED | OUTPATIENT
Start: 2022-03-21

## 2022-09-19 ENCOUNTER — TELEPHONE (OUTPATIENT)
Dept: INTERNAL MEDICINE CLINIC | Age: 67
End: 2022-09-19

## 2022-12-08 ENCOUNTER — APPOINTMENT (OUTPATIENT)
Dept: INTERNAL MEDICINE CLINIC | Age: 67
End: 2022-12-08

## 2022-12-08 ENCOUNTER — HOSPITAL ENCOUNTER (OUTPATIENT)
Dept: LAB | Age: 67
Discharge: HOME | End: 2022-12-08
Payer: MEDICARE

## 2022-12-08 DIAGNOSIS — E78.5 HYPERLIPIDEMIA, UNSPECIFIED HYPERLIPIDEMIA TYPE: ICD-10-CM

## 2022-12-08 DIAGNOSIS — Z12.5 SCREENING FOR MALIGNANT NEOPLASM OF PROSTATE: ICD-10-CM

## 2022-12-08 DIAGNOSIS — R73.01 IFG (IMPAIRED FASTING GLUCOSE): ICD-10-CM

## 2022-12-08 LAB
ALBUMIN SERPL-MCNC: 3.7 G/DL (ref 3.4–5)
ALBUMIN/GLOB SERPL: 1.1 {RATIO} (ref 0.8–1.7)
ALP SERPL-CCNC: 47 U/L (ref 45–117)
ALT SERPL-CCNC: 36 U/L (ref 16–61)
ANION GAP SERPL CALC-SCNC: 8 MMOL/L (ref 3–18)
AST SERPL-CCNC: 34 U/L (ref 10–38)
BILIRUB SERPL-MCNC: 0.6 MG/DL (ref 0.2–1)
BUN SERPL-MCNC: 15 MG/DL (ref 7–18)
BUN/CREAT SERPL: 12 (ref 12–20)
CALCIUM SERPL-MCNC: 9.6 MG/DL (ref 8.5–10.1)
CHLORIDE SERPL-SCNC: 103 MMOL/L (ref 100–111)
CHOLEST SERPL-MCNC: 230 MG/DL
CO2 SERPL-SCNC: 30 MMOL/L (ref 21–32)
CREAT SERPL-MCNC: 1.26 MG/DL (ref 0.6–1.3)
ERYTHROCYTE [DISTWIDTH] IN BLOOD BY AUTOMATED COUNT: 15.1 % (ref 11.6–14.5)
EST. AVERAGE GLUCOSE BLD GHB EST-MCNC: 123 MG/DL
GLOBULIN SER CALC-MCNC: 3.4 G/DL (ref 2–4)
GLUCOSE SERPL-MCNC: 109 MG/DL (ref 74–99)
HBA1C MFR BLD: 5.9 % (ref 4.2–5.6)
HCT VFR BLD AUTO: 45.1 % (ref 36–48)
HDLC SERPL-MCNC: 95 MG/DL (ref 40–60)
HDLC SERPL: 2.4 {RATIO} (ref 0–5)
HGB BLD-MCNC: 14.2 G/DL (ref 13–16)
LDLC SERPL CALC-MCNC: 106.6 MG/DL (ref 0–100)
LIPID PROFILE,FLP: ABNORMAL
MCH RBC QN AUTO: 28.2 PG (ref 24–34)
MCHC RBC AUTO-ENTMCNC: 31.5 G/DL (ref 31–37)
MCV RBC AUTO: 89.7 FL (ref 78–100)
NRBC # BLD: 0 K/UL (ref 0–0.01)
NRBC BLD-RTO: 0 PER 100 WBC
PLATELET # BLD AUTO: 224 K/UL (ref 135–420)
PMV BLD AUTO: 11.6 FL (ref 9.2–11.8)
POTASSIUM SERPL-SCNC: 4.4 MMOL/L (ref 3.5–5.5)
PROT SERPL-MCNC: 7.1 G/DL (ref 6.4–8.2)
PSA SERPL-MCNC: 0.6 NG/ML (ref 0–4)
RBC # BLD AUTO: 5.03 M/UL (ref 4.35–5.65)
SODIUM SERPL-SCNC: 141 MMOL/L (ref 136–145)
TRIGL SERPL-MCNC: 142 MG/DL (ref ?–150)
VLDLC SERPL CALC-MCNC: 28.4 MG/DL
WBC # BLD AUTO: 6.1 K/UL (ref 4.6–13.2)

## 2022-12-08 PROCEDURE — 80053 COMPREHEN METABOLIC PANEL: CPT

## 2022-12-08 PROCEDURE — 83036 HEMOGLOBIN GLYCOSYLATED A1C: CPT

## 2022-12-08 PROCEDURE — 36415 COLL VENOUS BLD VENIPUNCTURE: CPT

## 2022-12-08 PROCEDURE — 84153 ASSAY OF PSA TOTAL: CPT

## 2022-12-08 PROCEDURE — 85027 COMPLETE CBC AUTOMATED: CPT

## 2022-12-08 PROCEDURE — 80061 LIPID PANEL: CPT

## 2022-12-10 NOTE — PROGRESS NOTES
79 y.o. black male who presents for evaluation    He retired but he's busy with chores and projects at home. No cardiovascular complaints. He has not really been checking his bp outside but running high in the office of late    Vitals 12/15/2022 12/15/2022 2021 2021 2021   Blood Pressure 149/82 164/89 132/76 147/80 132/77     Vitals 2021   Blood Pressure 140/77 138/83     Denies any GI or Gu issues. Denies polyuria, polydipsia, nocturia, vision change, not checking his sugars regularly. He's declined statins for years but may be open to further risk stratification at this time     [address]: 21, 12/15/22    Past Medical History:   Diagnosis Date    Colon polyps 2006    Dr Kasandra Jade ;  adenoma/hyperplastic and divertics    Degenerative disc disease, lumbar 2016    degen disc disease worst L4-S1    ED (erectile dysfunction)     Hyperlipidemia     calculated 10 yr risk score 14.9% (); declined statin and ca score    Hypertension     IFG (impaired fasting glucose)     Tinea versicolor 2021    Transaminasemia 2006    neg US and serologies     Past Surgical History:   Procedure Laterality Date    HX COLONOSCOPY      Dr. Kasandra Jade  polyps; 16 polyps and divertics; 20 neg    HX GI  2019    CT abd/pelvis liver and kidney cysts    [place] CHEST SURGERY PROCEDURE UNLISTED      US thyroid negative     Social History     Socioeconomic History    Marital status:      Spouse name: Not on file    Number of children: Not on file    Years of education: Not on file    Highest education level: Not on file   Occupational History    Occupation: [employer]   Tobacco Use    Smoking status: Former     Packs/day: 0.25     Years: 7.00     Pack years: 1.75     Types: Cigarettes     Quit date: 1980     Years since quittin.9    Smokeless tobacco: Never   Substance and Sexual Activity    Alcohol use: Yes     Comment: social    Drug use:  No Sexual activity: Not on file   Other Topics Concern    Not on file   Social History Narrative    Not on file     Social Determinants of Health     Financial Resource Strain: Not on file   Food Insecurity: Not on file   Transportation Needs: Not on file   Physical Activity: Not on file   Stress: Not on file   Social Connections: Not on file   Intimate Partner Violence: Not on file   Housing Stability: Not on file     Family History   Problem Relation Age of Onset    Cancer Father         lung cancer    Cancer Sister         lung cancer     Current Outpatient Medications   Medication Sig    ibuprofen (MOTRIN) 600 mg tablet     dilTIAZem ER (Taztia XT) 180 mg capsule Take 1 Capsule by mouth daily. lisinopriL (PRINIVIL, ZESTRIL) 10 mg tablet Take 1 Tablet by mouth daily. naproxen (NAPROSYN) 500 mg tablet Take 1 Tab by mouth two (2) times daily (with meals). MULTIVITS/IRON FUM/FA/D3/LYCOP (MULTI FOR HIM PO) Take  by mouth. No current facility-administered medications for this visit. Allergies   Allergen Reactions    Cialis [Tadalafil] Other (comments)     Head congestion    Levitra [Vardenafil] Other (comments)     Head congestion    Viagra [Sildenafil] Other (comments)     Head congestion     Current Outpatient Medications   Medication Sig    ibuprofen (MOTRIN) 600 mg tablet     dilTIAZem ER (Taztia XT) 180 mg capsule Take 1 Capsule by mouth daily. lisinopriL (PRINIVIL, ZESTRIL) 10 mg tablet Take 1 Tablet by mouth daily. naproxen (NAPROSYN) 500 mg tablet Take 1 Tab by mouth two (2) times daily (with meals). MULTIVITS/IRON FUM/FA/D3/LYCOP (MULTI FOR HIM PO) Take  by mouth. No current facility-administered medications for this visit.      REVIEW OF SYSTEMS: colo 7/20  Ophtho - no vision change or eye pain  Oral - no mouth pain, tongue or tooth problems  Ears - no hearing loss, ear pain, fullness, no swallowing problems  Cardiac - no CP, PND, orthopnea, edema, palpitations or syncope  Chest - no breast masses  Resp - no wheezing, chronic coughing, dyspnea  GI - no heartburn, nausea, vomiting, change in bowel habits, bleeding, hemorrhoids  Urinary - no dysuria, hematuria, flank pain, urgency, frequency  Genitals - no genital lesions, discharge, masses, ulceration, warts    Visit Vitals  BP (!) 149/82   Pulse 74   Temp 97 °F (36.1 °C) (Temporal)   Resp 20   Ht 5' 7\" (1.702 m)   Wt 167 lb (75.8 kg)   SpO2 98%   BMI 26.16 kg/m²     A&O x3  Affect is appropriate. Mood stable  No apparent distress  Anicteric, no JVD, adenopathy or thyromegaly. No carotid bruits or radiated murmur  Lungs clear to auscultation, no wheezes or rales  Heart showed regular rate and rhythm. No murmur, rubs, gallops  Abdomen soft nontender, no hepatosplenomegaly or masses. Extremities without edema.   Pulses 1-2+ symmetrically  Rectal showed guaiac neg brown stool normal tone  Prostate about 30 gm without asymmetry nodularity or tenderness    LABS  From 9/10 showed   gluc 111, cr 1.20, gfr>60,  alt 45, hba1c 5.9,                 chol 226, tg 133, hdl 77,   ldl-c 122, wbc 5.0, hb 14.3, plt 179, psa 0.4  From 11/11 showed gluc 102, cr 1.20, gfr>60,  alt 46, hba1c 6.1, ldl-p 901, chol 213, tg 87,   hdl 92,   ldl-c 104, wbc 5.4, hb 14.2, plt 214, psa 0.6, ua neg  From 12/12 showed gluc 109, cr 0.96, gfr 101, alt 23, hba1c 6.1, ldl-p 837, chol 207, tg 52,   hdl 87,   ldl-c 110,                     psa 0.6  From 12/13 showed gluc 110, cr 1.06, gfr 77,   alt 21, hba1c 6.2,     chol 224, tg 54,   hdl 107, ldl-c 106, wbc 4.5, hb 14.4, plt 252, psa 0.6  From 12/14 showed gluc 112, cr 1.13, gfr>60,  alt 9,   hba1c 6.3,     chol 241, tg 71,   hdl 111, ldl-c 116, wbc 5.5, hb 15.0, plt 222, psa 0.6  From 12/15 showed gluc 78,   cr 0.98, gfr>60,  alt 36,      chol 205, tg 52,   hdl 120, ldl-c 75,   wbc 4.2, hb 14.7, plt 238,           tsh 0.89  From 12/16 showed gluc 102, cr 1.15, gfr>60,  alt 38, hba1c 5.9,     chol 252, tg 93,   hdl 127, ldl-c 106, wbc 5.0, hb 15.0, plt 228  From 12/17 showed gluc 108, cr 1.18, gfr>60,  alt 32, hba1c 6.1,     chol 229, tg 78,   hdl 125, ldl-c 88,   wbc 4.9, hb 14.1, plt 221, psa 0.8, hep c-  From 12/18 showed gluc 90,   cr 1.06, gfr>60,  alt 32,      chol 220, tg 73,   hdl 113, ldl-c 92,   wbc 5.8, hb 14.4, plt 206, psa 0.8  From 12/19 showed gluc 97,   cr 1.16, gfr>60,  alt 35,      chol 233, tg 102, hdl 107, ldl-c 106, wbc 4.6, hb 14.6, plt 238, psa 0.7  From 12/20 showed gluc 96,   cr 1.07, gfr>60,  alt 30,                 chol 236, tg 75,   hdl 117, ldl-c 104, wbc 5.9, hb 14.4, plt 143, psa 0.7, ua neg   From 12/21 showed gluc 90,   cr 1.14, gfr>60,  alt 49, hba1c 5.7,     chol 222, tg 149, hdl 99,   ldl-c 94,   wbc 5.6, hb 14.9, plt 181, psa 0.7    Results for orders placed or performed during the hospital encounter of 12/08/22   CBC W/O DIFF   Result Value Ref Range    WBC 6.1 4.6 - 13.2 K/uL    RBC 5.03 4.35 - 5.65 M/uL    HGB 14.2 13.0 - 16.0 g/dL    HCT 45.1 36.0 - 48.0 %    MCV 89.7 78.0 - 100.0 FL    MCH 28.2 24.0 - 34.0 PG    MCHC 31.5 31.0 - 37.0 g/dL    RDW 15.1 (H) 11.6 - 14.5 %    PLATELET 686 486 - 244 K/uL    MPV 11.6 9.2 - 11.8 FL    NRBC 0.0 0  WBC    ABSOLUTE NRBC 0.00 0.00 - 1.97 K/uL   METABOLIC PANEL, COMPREHENSIVE   Result Value Ref Range    Sodium 141 136 - 145 mmol/L    Potassium 4.4 3.5 - 5.5 mmol/L    Chloride 103 100 - 111 mmol/L    CO2 30 21 - 32 mmol/L    Anion gap 8 3.0 - 18 mmol/L    Glucose 109 (H) 74 - 99 mg/dL    BUN 15 7.0 - 18 MG/DL    Creatinine 1.26 0.6 - 1.3 MG/DL    BUN/Creatinine ratio 12 12 - 20      eGFR >60 >60 ml/min/1.73m2    Calcium 9.6 8.5 - 10.1 MG/DL    Bilirubin, total 0.6 0.2 - 1.0 MG/DL    ALT (SGPT) 36 16 - 61 U/L    AST (SGOT) 34 10 - 38 U/L    Alk.  phosphatase 47 45 - 117 U/L    Protein, total 7.1 6.4 - 8.2 g/dL    Albumin 3.7 3.4 - 5.0 g/dL    Globulin 3.4 2.0 - 4.0 g/dL    A-G Ratio 1.1 0.8 - 1.7     LIPID PANEL   Result Value Ref Range    LIPID PROFILE          Cholesterol, total 230 (H) <200 MG/DL    Triglyceride 142 <150 MG/DL    HDL Cholesterol 95 (H) 40 - 60 MG/DL    LDL, calculated 106.6 (H) 0 - 100 MG/DL    VLDL, calculated 28.4 MG/DL    CHOL/HDL Ratio 2.4 0 - 5.0     PSA SCREENING (SCREENING)   Result Value Ref Range    Prostate Specific Ag 0.6 0.0 - 4.0 ng/mL   HEMOGLOBIN A1C WITH EAG   Result Value Ref Range    Hemoglobin A1c 5.9 (H) 4.2 - 5.6 %    Est. average glucose 123 mg/dL     We reviewed the patient's labs from the last several visits to point out trends in the numbers          Patient Active Problem List   Diagnosis Code    IFG (impaired fasting glucose) R73.01    Essential hypertension I10    Erectile dysfunction N52.9    Colon adenoma and diverticulosis Dr Gricelda Piedra D12.6    Hyperlipidemia E78.5     ASSESSMENT AND PLAN:  1. Hypertension. Inc michell to 10 and he will call in some readings thereafter  2. Colon polyps. Fiber, colo 2026  3. Erectile dysfunction. Off meds  4. IFG. Lifestyle and dietary measures  5. Lipids. Another long discussion about risks and benefits of statins. Sched ca score and go from there        RTC 12/23    Above conditions discussed at length and patient vocalized understanding. All questions answered to patient satisfaction          ICD-10-CM ICD-9-CM    1. Essential hypertension  I10 401.9 dilTIAZem ER (Taztia XT) 180 mg capsule      lisinopriL (PRINIVIL, ZESTRIL) 10 mg tablet      2. Hyperlipidemia, unspecified hyperlipidemia type  E78.5 272.4 CT HEART W/O CONT WITH CALCIUM      3. IFG (impaired fasting glucose)  R73.01 790.21       4.  Colon adenoma and diverticulosis Dr Gricelda Piedra  D12.6 211.3

## 2022-12-13 NOTE — PROGRESS NOTES
Kris Kearns presents today for   Chief Complaint   Patient presents with    Follow-up    Labs     12-8-22    Hypertension     Essential    Blood sugar problem     IFG    Hyperlipidemia     1. \"Have you been to the ER, urgent care clinic since your last visit? Hospitalized since your last visit? \" no    2. \"Have you seen or consulted any other health care providers outside of the [address] [place] [address] since your last visit? \" no     3. For patients aged 39-70: Has the patient had a colonoscopy / FIT/ Cologuard? Yes - no Care Gap present      If the patient is female:    4. For patients aged 41-77: Has the patient had a mammogram within the past 2 years? NA - based on age or sex  See top three    5. For patients aged 21-65: Has the patient had a pap smear?  NA - based on age or sex

## 2022-12-15 ENCOUNTER — OFFICE VISIT (OUTPATIENT)
Dept: INTERNAL MEDICINE CLINIC | Age: 67
End: 2022-12-15
Payer: MEDICARE

## 2022-12-15 VITALS
OXYGEN SATURATION: 98 % | RESPIRATION RATE: 20 BRPM | BODY MASS INDEX: 26.21 KG/M2 | DIASTOLIC BLOOD PRESSURE: 82 MMHG | HEART RATE: 74 BPM | TEMPERATURE: 97 F | WEIGHT: 167 LBS | SYSTOLIC BLOOD PRESSURE: 149 MMHG | HEIGHT: 67 IN

## 2022-12-15 DIAGNOSIS — E78.5 HYPERLIPIDEMIA, UNSPECIFIED HYPERLIPIDEMIA TYPE: ICD-10-CM

## 2022-12-15 DIAGNOSIS — R73.01 IFG (IMPAIRED FASTING GLUCOSE): ICD-10-CM

## 2022-12-15 DIAGNOSIS — Z12.5 SPECIAL SCREENING FOR MALIGNANT NEOPLASM OF PROSTATE: ICD-10-CM

## 2022-12-15 DIAGNOSIS — Z71.89 ADVANCED DIRECTIVES, COUNSELING/DISCUSSION: ICD-10-CM

## 2022-12-15 DIAGNOSIS — D12.6 COLON ADENOMA: ICD-10-CM

## 2022-12-15 DIAGNOSIS — I10 ESSENTIAL HYPERTENSION: ICD-10-CM

## 2022-12-15 DIAGNOSIS — Z00.00 MEDICARE ANNUAL WELLNESS VISIT, SUBSEQUENT: Primary | ICD-10-CM

## 2022-12-15 LAB
HEMOCCULT STL QL: NEGATIVE
VALID INTERNAL CONTROL?: YES

## 2022-12-15 PROCEDURE — 82272 OCCULT BLD FECES 1-3 TESTS: CPT | Performed by: INTERNAL MEDICINE

## 2022-12-15 PROCEDURE — G0463 HOSPITAL OUTPT CLINIC VISIT: HCPCS | Performed by: INTERNAL MEDICINE

## 2022-12-15 PROCEDURE — 99497 ADVNCD CARE PLAN 30 MIN: CPT | Performed by: INTERNAL MEDICINE

## 2022-12-15 RX ORDER — IBUPROFEN 600 MG/1
TABLET ORAL
COMMUNITY
Start: 2022-09-27

## 2022-12-15 RX ORDER — DILTIAZEM HYDROCHLORIDE 180 MG/1
180 CAPSULE, EXTENDED RELEASE ORAL DAILY
Qty: 90 CAPSULE | Refills: 3 | Status: SHIPPED | OUTPATIENT
Start: 2022-12-15

## 2022-12-15 RX ORDER — LISINOPRIL 10 MG/1
10 TABLET ORAL DAILY
Qty: 90 TABLET | Refills: 3 | Status: SHIPPED | OUTPATIENT
Start: 2022-12-15

## 2022-12-15 RX ORDER — LISINOPRIL 5 MG/1
5 TABLET ORAL DAILY
Qty: 90 TABLET | Refills: 3 | Status: CANCELLED | OUTPATIENT
Start: 2022-12-15

## 2022-12-15 NOTE — PATIENT INSTRUCTIONS
Medicare Wellness Visit, Male    The best way to live healthy is to have a lifestyle where you eat a well-balanced diet, exercise regularly, limit alcohol use, and quit all forms of tobacco/nicotine, if applicable. Regular preventive services are another way to keep healthy. Preventive services (vaccines, screening tests, monitoring & exams) can help personalize your care plan, which helps you manage your own care. Screening tests can find health problems at the earliest stages, when they are easiest to treat. rony follows the current, evidence-based guidelines published by the [facility] ([facility]) when recommending preventive services for our patients. Because we follow these guidelines, sometimes recommendations change over time as research supports it. (For example, a prostate screening blood test is no longer routinely recommended for men with no symptoms). Of course, you and your doctor may decide to screen more often for some diseases, based on your risk and co-morbidities (chronic disease you are already diagnosed with). Preventive services for you include:  - Medicare offers their members a free annual wellness visit, which is time for you and your primary care provider to discuss and plan for your preventive service needs.  Take advantage of this benefit every year!    -Over the age of 72 should receive the recommended pneumonia vaccines.   -All adults should have a flu vaccine yearly.  -All adults should receive a tetanus vaccine every 10 years.  -Over the age of 48 should receive the shingles vaccines.    -All adults should be screened once for Hepatitis C.  -All adults age 38-68 who are overweight should have a diabetes screening test once every three years.   -Other screening tests & preventive services for persons with diabetes include: an eye exam to screen for diabetic retinopathy, a kidney function test, a foot exam, and stricter control over your cholesterol.   -Cardiovascular screening for adults with routine risk involves an electrocardiogram (ECG) at intervals determined by the provider.     -Colorectal cancer screening should be done for adults age 43-69 with no increased risk factors for colorectal cancer. There are a number of acceptable methods of screening for this type of cancer. Each test has its own benefits and drawbacks. Discuss with your provider what is most appropriate for you during your annual wellness visit. The different tests include: colonoscopy (considered the best screening method), a fecal occult blood test, a fecal DNA test, and sigmoidoscopy.    -Lung cancer screening is recommended annually with a low dose CT scan for adults between age 54 and 68, who have smoked at least 30 pack years (equivalent of 1 pack per day for 30 days), and who is a current smoker or quit less than 15 years ago. -An Abdominal Aortic Aneurysm (AAA) Screening is recommended for men age 73-68 who has ever smoked in their lifetime.      Here is a list of your current Health Maintenance items (your personalized list of preventive services) with a due date:  Health Maintenance Due   Topic Date Due    Shingles Vaccine (1 of 2) Never done    COVID-19 Vaccine (4 - Booster for Moderna series) 11/26/2021    Yearly Flu Vaccine (1) 08/01/2022    Depresssion Screening  12/14/2022

## 2022-12-15 NOTE — PROGRESS NOTES
This is a subsequent medicare wellness visit    I have reviewed the patient's medical history in detail and updated the computerized patient record. Assessment/Plan   Education and counseling provided:  Are appropriate based on today's review and evaluation  End-of-Life planning (with patient's consent)  Influenza Vaccine  Prostate cancer screening tests (PSA, covered annually)  Colorectal cancer screening tests  Cardiovascular screening blood test  Diabetes screening test    1. Medicare annual wellness visit, subsequent  2. Essential hypertension  -     dilTIAZem ER (Taztia XT) 180 mg capsule; Take 1 Capsule by mouth daily. , Normal, Disp-90 Capsule, R-3  -     lisinopriL (PRINIVIL, ZESTRIL) 10 mg tablet; Take 1 Tablet by mouth daily. , Normal, Disp-90 Tablet, R-3  3. Hyperlipidemia, unspecified hyperlipidemia type  -     CT HEART W/O CONT WITH CALCIUM; Future  4. IFG (impaired fasting glucose)  5. Colon adenoma and diverticulosis Dr Moreno Zamora  -     AMB POC FECAL BLOOD, OCCULT, QL 1 CARD  6. Advanced directives, counseling/discussion  -     ADVANCE CARE PLANNING FIRST 30 MINS  7. Special screening for malignant neoplasm of prostate  Comments:  Discussed the potential benefits and harms of the prostate-specific antigen (PSA) serum level test as a screening tool for early prostate cancer detection. Orders:  -     UT PROSTATE CA SCREENING; PABLO       Depression Risk Factor Screening     3 most recent PHQ Screens 12/15/2022   Little interest or pleasure in doing things Not at all   Feeling down, depressed, irritable, or hopeless Not at all   Total Score PHQ 2 0       Alcohol & Drug Abuse Risk Screen    Do you average more than 1 drink per night or more than 7 drinks a week: No    In the past three months have you have had more than 4 drinks containing alcohol on one occasion: No          Functional Ability and Level of Safety    Hearing: Hearing is good. Activities of Daily Living:   The home contains: no safety equipment. Patient does total self care      Ambulation: with no difficulty     Fall Risk:  Fall Risk Assessment, last 12 mths 12/15/2022   Able to walk? Yes   Fall in past 12 months? 0   Do you feel unsteady?  0   Are you worried about falling 0      Abuse Screen:  Patient is not abused       Cognitive Screening    Has your family/caregiver stated any concerns about your memory: no     Cognitive Screening: Normal - Mini Cog Test    Health Maintenance Due     Health Maintenance Due   Topic Date Due    Shingrix Vaccine Age 49> (1 of 2) Never done    COVID-19 Vaccine (4 - Booster for Turcios Batch series) 11/26/2021    Flu Vaccine (1) 08/01/2022    Depression Screen  12/14/2022       Patient Care Team   Patient Care Team:  Isaiah Garcia MD as PCP - General (Internal Medicine Physician)  Isaiah Garcia MD as PCP [facility] Empaneled Provider    History     Patient Active Problem List   Diagnosis Code    IFG (impaired fasting glucose) R73.01    Essential hypertension I10    Erectile dysfunction N52.9    Colon adenoma and diverticulosis Dr Shayan Muro D12.6    Hyperlipidemia E78.5     Past Medical History:   Diagnosis Date    Colon polyps 2006    Dr Shayan Muro 2006; 2/17 adenoma/hyperplastic and divertics    Degenerative disc disease, lumbar 12/2016    degen disc disease worst L4-S1    ED (erectile dysfunction)     Hyperlipidemia     calculated 10 yr risk score 14.9% (12/21); declined statin and ca score    Hypertension     IFG (impaired fasting glucose)     Tinea versicolor 12/14/2021    Transaminasemia 2006    neg US and serologies      Past Surgical History:   Procedure Laterality Date    HX COLONOSCOPY      Dr. Shayan Muro 2006 polyps; 2/22/16 polyps and divertics; 7/28/20 neg    HX GI  01/2019    CT abd/pelvis liver and kidney cysts    GA CHEST SURGERY PROCEDURE UNLISTED  12/06    US thyroid negative     Current Outpatient Medications   Medication Sig Dispense Refill    ibuprofen (MOTRIN) 600 mg tablet       dilTIAZem ER Vergil Monk XT) 180 mg capsule Take 1 Capsule by mouth daily. 90 Capsule 3    lisinopriL (PRINIVIL, ZESTRIL) 10 mg tablet Take 1 Tablet by mouth daily. 90 Tablet 3    naproxen (NAPROSYN) 500 mg tablet Take 1 Tab by mouth two (2) times daily (with meals). 90 Tab 3    MULTIVITS/IRON FUM/FA/D3/LYCOP (MULTI FOR HIM PO) Take  by mouth.        Allergies   Allergen Reactions    Cialis [Tadalafil] Other (comments)     Head congestion    Levitra [Vardenafil] Other (comments)     Head congestion    Viagra [Sildenafil] Other (comments)     Head congestion       Family History   Problem Relation Age of Onset    Cancer Father         lung cancer    Cancer Sister         lung cancer     Social History     Tobacco Use    Smoking status: Former     Packs/day: 0.25     Years: 7.00     Pack years: 1.75     Types: Cigarettes     Quit date: 1980     Years since quittin.9    Smokeless tobacco: Never   Substance Use Topics    Alcohol use: Yes     Comment: social Cb Navas MD

## 2022-12-15 NOTE — ACP (ADVANCE CARE PLANNING)
Advance Care Planning     Advance Care Planning (ACP) Physician/NP/PA Conversation      Date of Conversation: 12/15/2022  Conducted with: Patient with Decision Making Capacity    Healthcare Decision Maker:     Primary Decision Maker: Jhony Little - [phone]  Click here to complete [address] including selection of the Healthcare Decision Maker Relationship (ie \"Primary\")      Today we documented Decision Maker(s) consistent with Legal Next of Kin hierarchy. Care Preferences:    Hospitalization: \"If your health worsens and it becomes clear that your chance of recovery is unlikely, what would be your preference regarding hospitalization? \"  The patient would prefer hospitalization. Ventilation: \"If you were unable to breathe on your own and your chance of recovery was unlikely, what would be your preference about the use of a ventilator (breathing machine) if it was available to you? \"   The patient would desire the use of a ventilator. Resuscitation: \"In the event your heart stopped as a result of an underlying serious health condition, would you want attempts to be made to restart your heart, or would you prefer a natural death? \"   Yes, attempt to resuscitate.     Additional topics discussed: ventilation preferences, hospitalization preferences, and resuscitation preferences    Conversation Outcomes / Follow-Up Plan:   ACP in process - information provided, considering goals and options  Reviewed DNR/DNI and patient elects Full Code (Attempt Resuscitation)     Length of Voluntary ACP Conversation in minutes:  16 minutes    Braxton Sheridan MD

## 2022-12-29 DIAGNOSIS — E78.5 HYPERLIPIDEMIA, UNSPECIFIED HYPERLIPIDEMIA TYPE: ICD-10-CM

## 2023-03-06 ENCOUNTER — TELEPHONE (OUTPATIENT)
Age: 68
End: 2023-03-06

## 2023-03-06 DIAGNOSIS — I10 ESSENTIAL HYPERTENSION: Primary | ICD-10-CM

## 2023-03-06 RX ORDER — LISINOPRIL 10 MG/1
10 TABLET ORAL DAILY
Qty: 90 TABLET | Refills: 0 | Status: SHIPPED | OUTPATIENT
Start: 2023-03-06

## 2023-03-06 RX ORDER — DILTIAZEM HYDROCHLORIDE 180 MG/1
180 CAPSULE, EXTENDED RELEASE ORAL DAILY
Qty: 90 CAPSULE | Refills: 0 | Status: SHIPPED | OUTPATIENT
Start: 2023-03-06

## 2023-03-06 NOTE — TELEPHONE ENCOUNTER
Requested Prescriptions     Signed Prescriptions Disp Refills    dilTIAZem (TIAZAC) 180 MG extended release capsule 90 capsule 0     Sig: Take 1 capsule by mouth daily     Authorizing Provider: ZAKIA LANG    lisinopril (PRINIVIL;ZESTRIL) 10 MG tablet 90 tablet 0     Sig: Take 1 tablet by mouth daily     Authorizing Provider: Sony Villa

## 2023-03-06 NOTE — TELEPHONE ENCOUNTER
Please send to [facility]. They did not receive when they were sent back in December. dilTIAZem ER (Taztia XT) 180 mg capsule 90 Capsule 3 12/15/2022     Sig - Route: Take 1 Capsule by mouth daily. - Oral    Sent to pharmacy as: dilTIAZem  mg capsule,24 hr,extended release Jackquelyn Royals XT)    E-Prescribing Status: Receipt confirmed by pharmacy (12/15/2022 12:58 PM EST)      lisinopriL (PRINIVIL, ZESTRIL) 10 mg tablet 90 Tablet 3 12/15/2022     Sig - Route:  Take 1 Tablet by mouth daily. - Oral    Sent to pharmacy as: lisinopriL 10 mg tablet (Cornelius Steen)    E-Prescribing Status: Receipt confirmed by pharmacy (12/15/2022 12:58 PM EST)

## 2023-06-20 ENCOUNTER — TELEPHONE (OUTPATIENT)
Age: 68
End: 2023-06-20

## 2023-06-20 DIAGNOSIS — I10 ESSENTIAL HYPERTENSION: ICD-10-CM

## 2023-06-20 RX ORDER — DILTIAZEM HYDROCHLORIDE 180 MG/1
180 CAPSULE, EXTENDED RELEASE ORAL DAILY
Qty: 90 CAPSULE | Refills: 3 | Status: SHIPPED | OUTPATIENT
Start: 2023-06-20

## 2023-06-22 ENCOUNTER — TELEPHONE (OUTPATIENT)
Age: 68
End: 2023-06-22

## 2023-06-22 DIAGNOSIS — I10 ESSENTIAL HYPERTENSION: ICD-10-CM

## 2023-06-22 RX ORDER — LISINOPRIL 10 MG/1
10 TABLET ORAL DAILY
Qty: 90 TABLET | Refills: 3 | Status: SHIPPED | OUTPATIENT
Start: 2023-06-22

## 2023-06-22 NOTE — TELEPHONE ENCOUNTER
Requested refill:    - lisinopril (PRINIVIL;ZESTRIL) 10 MG tablet    Pharmacy: [address]    Patient's wife called to check on status of refill, states she requested this along with the dilTIAZem [facility]) on Monday 6/20/2023. The tiazac was sent; however, it looks like it was the only medication requested in that encounter and patient is now out of the lisinopril, she is requesting for it to be refilled asap.

## 2023-12-10 ENCOUNTER — TELEPHONE (OUTPATIENT)
Age: 68
End: 2023-12-10

## 2023-12-10 DIAGNOSIS — R73.01 IFG (IMPAIRED FASTING GLUCOSE): Primary | ICD-10-CM

## 2023-12-10 DIAGNOSIS — Z12.5 SCREENING FOR MALIGNANT NEOPLASM OF PROSTATE: ICD-10-CM

## 2023-12-10 DIAGNOSIS — E78.5 HYPERLIPIDEMIA, UNSPECIFIED HYPERLIPIDEMIA TYPE: ICD-10-CM

## 2023-12-11 ENCOUNTER — HOSPITAL ENCOUNTER (OUTPATIENT)
Facility: HOSPITAL | Age: 68
Setting detail: SPECIMEN
Discharge: HOME | End: 2023-12-14
Payer: MEDICARE

## 2023-12-11 DIAGNOSIS — Z12.5 SCREENING FOR MALIGNANT NEOPLASM OF PROSTATE: ICD-10-CM

## 2023-12-11 DIAGNOSIS — E78.5 HYPERLIPIDEMIA, UNSPECIFIED HYPERLIPIDEMIA TYPE: ICD-10-CM

## 2023-12-11 DIAGNOSIS — R73.01 IFG (IMPAIRED FASTING GLUCOSE): ICD-10-CM

## 2023-12-11 LAB
ALBUMIN SERPL-MCNC: 3.8 G/DL (ref 3.4–5)
ALBUMIN/GLOB SERPL: 1.1 (ref 0.8–1.7)
ALP SERPL-CCNC: 48 U/L (ref 45–117)
ALT SERPL-CCNC: 33 U/L (ref 16–61)
ANION GAP SERPL CALC-SCNC: 8 MMOL/L (ref 3–18)
AST SERPL-CCNC: 36 U/L (ref 10–38)
BASOPHILS # BLD: 0.1 K/UL (ref 0–0.1)
BASOPHILS NFR BLD: 1 % (ref 0–2)
BILIRUB SERPL-MCNC: 0.5 MG/DL (ref 0.2–1)
BUN SERPL-MCNC: 19 MG/DL (ref 7–18)
BUN/CREAT SERPL: 15 (ref 12–20)
CALCIUM SERPL-MCNC: 9.4 MG/DL (ref 8.5–10.1)
CHLORIDE SERPL-SCNC: 107 MMOL/L (ref 100–111)
CHOLEST SERPL-MCNC: 221 MG/DL
CO2 SERPL-SCNC: 26 MMOL/L (ref 21–32)
CREAT SERPL-MCNC: 1.29 MG/DL (ref 0.6–1.3)
DIFFERENTIAL METHOD BLD: ABNORMAL
EOSINOPHIL # BLD: 0.3 K/UL (ref 0–0.4)
EOSINOPHIL NFR BLD: 4 % (ref 0–5)
ERYTHROCYTE [DISTWIDTH] IN BLOOD BY AUTOMATED COUNT: 15.1 % (ref 11.6–14.5)
GLOBULIN SER CALC-MCNC: 3.4 G/DL (ref 2–4)
GLUCOSE SERPL-MCNC: 114 MG/DL (ref 74–99)
HBA1C MFR BLD: 5.8 % (ref 4.2–5.6)
HCT VFR BLD AUTO: 44.2 % (ref 36–48)
HDLC SERPL-MCNC: 85 MG/DL (ref 40–60)
HDLC SERPL: 2.6 (ref 0–5)
HGB BLD-MCNC: 14 G/DL (ref 13–16)
IMM GRANULOCYTES # BLD AUTO: 0 K/UL (ref 0–0.04)
IMM GRANULOCYTES NFR BLD AUTO: 0 % (ref 0–0.5)
LDLC SERPL CALC-MCNC: 111.2 MG/DL (ref 0–100)
LIPID PANEL: ABNORMAL
LYMPHOCYTES # BLD: 3.2 K/UL (ref 0.9–3.6)
LYMPHOCYTES NFR BLD: 50 % (ref 21–52)
MCH RBC QN AUTO: 28.9 PG (ref 24–34)
MCHC RBC AUTO-ENTMCNC: 31.7 G/DL (ref 31–37)
MCV RBC AUTO: 91.1 FL (ref 78–100)
MONOCYTES # BLD: 0.7 K/UL (ref 0.05–1.2)
MONOCYTES NFR BLD: 11 % (ref 3–10)
NEUTS SEG # BLD: 2.1 K/UL (ref 1.8–8)
NEUTS SEG NFR BLD: 34 % (ref 40–73)
NRBC # BLD: 0 K/UL (ref 0–0.01)
NRBC BLD-RTO: 0 PER 100 WBC
PLATELET # BLD AUTO: 230 K/UL (ref 135–420)
PMV BLD AUTO: 11.3 FL (ref 9.2–11.8)
POTASSIUM SERPL-SCNC: 5.3 MMOL/L (ref 3.5–5.5)
PROT SERPL-MCNC: 7.2 G/DL (ref 6.4–8.2)
PSA SERPL-MCNC: 0.7 NG/ML (ref 0–4)
RBC # BLD AUTO: 4.85 M/UL (ref 4.35–5.65)
SODIUM SERPL-SCNC: 141 MMOL/L (ref 136–145)
TRIGL SERPL-MCNC: 124 MG/DL
VLDLC SERPL CALC-MCNC: 24.8 MG/DL
WBC # BLD AUTO: 6.3 K/UL (ref 4.6–13.2)

## 2023-12-11 PROCEDURE — 36415 COLL VENOUS BLD VENIPUNCTURE: CPT

## 2023-12-11 PROCEDURE — 83036 HEMOGLOBIN GLYCOSYLATED A1C: CPT

## 2023-12-11 PROCEDURE — G0103 PSA SCREENING: HCPCS

## 2023-12-11 PROCEDURE — 80053 COMPREHEN METABOLIC PANEL: CPT

## 2023-12-11 PROCEDURE — 85025 COMPLETE CBC W/AUTO DIFF WBC: CPT

## 2023-12-11 PROCEDURE — 80061 LIPID PANEL: CPT

## 2024-01-05 ENCOUNTER — HOSPITAL ENCOUNTER (OUTPATIENT)
Facility: HOSPITAL | Age: 69
Discharge: HOME | End: 2024-01-05
Attending: INTERNAL MEDICINE
Payer: MEDICARE

## 2024-01-05 ENCOUNTER — HOSPITAL ENCOUNTER (OUTPATIENT)
Facility: HOSPITAL | Age: 69
End: 2024-01-05
Attending: INTERNAL MEDICINE
Payer: MEDICARE

## 2024-01-05 VITALS
WEIGHT: 160 LBS | HEIGHT: 67 IN | DIASTOLIC BLOOD PRESSURE: 75 MMHG | SYSTOLIC BLOOD PRESSURE: 154 MMHG | HEART RATE: 51 BPM | BODY MASS INDEX: 25.11 KG/M2

## 2024-01-05 DIAGNOSIS — R07.9 CHEST PAIN, UNSPECIFIED TYPE: ICD-10-CM

## 2024-01-05 LAB
ECHO BSA: 1.85 M2
NUC STRESS EJECTION FRACTION: 54 %
STRESS BASELINE DIAS BP: 75 MMHG
STRESS BASELINE HR: 50 BPM
STRESS BASELINE SYS BP: 154 MMHG
STRESS ESTIMATED WORKLOAD: 1 METS
STRESS EXERCISE DUR MIN: 4 MIN
STRESS EXERCISE DUR SEC: 0 SEC
STRESS PEAK DIAS BP: 75 MMHG
STRESS PEAK SYS BP: 154 MMHG
STRESS PERCENT HR ACHIEVED: 49 %
STRESS POST PEAK HR: 75 BPM
STRESS RATE PRESSURE PRODUCT: NORMAL BPM*MMHG
STRESS ST DEPRESSION: 0 MM
STRESS TARGET HR: 152 BPM
TID: 1.22

## 2024-01-05 PROCEDURE — 93018 CV STRESS TEST I&R ONLY: CPT | Performed by: INTERNAL MEDICINE

## 2024-01-05 PROCEDURE — 93016 CV STRESS TEST SUPVJ ONLY: CPT | Performed by: INTERNAL MEDICINE

## 2024-01-05 PROCEDURE — 78452 HT MUSCLE IMAGE SPECT MULT: CPT | Performed by: INTERNAL MEDICINE

## 2024-01-05 PROCEDURE — A9502 TC99M TETROFOSMIN: HCPCS | Performed by: INTERNAL MEDICINE

## 2024-01-05 PROCEDURE — 2580000003 HC RX 258: Performed by: INTERNAL MEDICINE

## 2024-01-05 PROCEDURE — 78452 HT MUSCLE IMAGE SPECT MULT: CPT

## 2024-01-05 PROCEDURE — 93017 CV STRESS TEST TRACING ONLY: CPT

## 2024-01-05 PROCEDURE — 3430000000 HC RX DIAGNOSTIC RADIOPHARMACEUTICAL: Performed by: INTERNAL MEDICINE

## 2024-01-05 PROCEDURE — 6360000002 HC RX W HCPCS: Performed by: INTERNAL MEDICINE

## 2024-01-05 RX ORDER — REGADENOSON 0.08 MG/ML
0.4 INJECTION, SOLUTION INTRAVENOUS
Status: COMPLETED | OUTPATIENT
Start: 2024-01-05 | End: 2024-01-05

## 2024-01-05 RX ORDER — 0.9 % SODIUM CHLORIDE 0.9 %
250 INTRAVENOUS SOLUTION INTRAVENOUS
Status: COMPLETED | OUTPATIENT
Start: 2024-01-05 | End: 2024-01-05

## 2024-01-05 RX ADMIN — TETROFOSMIN 11 MILLICURIE: 1.38 INJECTION, POWDER, LYOPHILIZED, FOR SOLUTION INTRAVENOUS at 08:10

## 2024-01-05 RX ADMIN — REGADENOSON 0.4 MG: 0.08 INJECTION, SOLUTION INTRAVENOUS at 11:00

## 2024-01-05 RX ADMIN — TETROFOSMIN 33 MILLICURIE: 1.38 INJECTION, POWDER, LYOPHILIZED, FOR SOLUTION INTRAVENOUS at 11:00

## 2024-01-05 RX ADMIN — SODIUM CHLORIDE 250 ML: 900 INJECTION, SOLUTION INTRAVENOUS at 11:00

## 2024-02-04 ENCOUNTER — TELEPHONE (OUTPATIENT)
Age: 69
End: 2024-02-04

## 2024-06-15 NOTE — PROGRESS NOTES
61 y.o. black male who presents for RPE. 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 Denies any GI or Gu issues. Denies polyuria, polydipsia, nocturia, vision change, not checking his sugars regularly. Weight is down as his diet is somewhat better     Vitals 2018 2017 10/24/2017 2016 2016   Weight 149 lb 9.6 oz 156 lb 155 lb 158 lb 156 lb     The back issues continue. Mainly in the bilat lower back without associated urinary or gi sx. Prior films showed lumbar disc diease but he denies any radicular sx down the buttocks/legs.   He is interested in doing imaging to further evaluate    Past Medical History:   Diagnosis Date    Arthritis 2016    degen disc disease worst L4-S1    Colon polyps 2006    Dr Mabel Ashley 2006;  adenoma/hyperplastic and divertics    ED (erectile dysfunction)     Hypertension     Prediabetes     Transaminasemia 2006    neg US and serologies     Past Surgical History:   Procedure Laterality Date    CHEST SURGERY PROCEDURE UNLISTED      US thyroid negative    HX COLONOSCOPY      Dr. Mabel Ashley 2006 polyps; 16 polyps and divertics     Social History     Socioeconomic History    Marital status:      Spouse name: Not on file    Number of children: Not on file    Years of education: Not on file    Highest education level: Not on file   Social Needs    Financial resource strain: Not on file    Food insecurity - worry: Not on file    Food insecurity - inability: Not on file   Acusphere needs - medical: Not on file   Acusphere needs - non-medical: Not on file   Occupational History    Occupation: [employer]   Tobacco Use    Smoking status: Former Smoker     Last attempt to quit: 1980     Years since quittin.9    Smokeless tobacco: Never Used   Substance and Sexual Activity    Alcohol use: Yes     Comment: social    Drug use: No    Sexual activity: Not on Received shift report and assumed care of patient.  Patient awake, calm and stable, currently positioned in bed for comfort and safety; call light within reach.  Denies pain or discomfort at this time.  Will continue to monitor.   file   Other Topics Concern    Not on file   Social History Narrative    Not on file     Family History   Problem Relation Age of Onset    Cancer Father         lung cancer    Cancer Sister         lung cancer     Immunization History   Administered Date(s) Administered    Influenza Vaccine 10/15/2013, 10/01/2015, 11/01/2017    Influenza Vaccine Nasal 10/01/2011    Influenza Vaccine Split 10/01/2012    Influenza Vaccine Whole 01/01/2009    TD Vaccine 01/01/2006    Zoster Vaccine, Live 01/01/2015     Allergies   Allergen Reactions    Cialis [Tadalafil] Other (comments)     Head congestion    Levitra [Vardenafil] Other (comments)     Head congestion    Viagra [Sildenafil] Other (comments)     Head congestion     Current Outpatient Medications   Medication Sig    lisinopril (PRINIVIL, ZESTRIL) 5 mg tablet Take 1 Tab by mouth daily.  dilTIAZem (TAZTIA XT) 180 mg SR capsule Take 1 Cap by mouth daily.  naproxen (NAPROSYN) 500 mg tablet Take 1 Tab by mouth two (2) times daily (with meals).  MULTIVITS/IRON FUM/FA/D3/LYCOP (MULTI FOR HIM PO) Take  by mouth. No current facility-administered medications for this visit.       REVIEW OF SYSTEMS: colo 2/16 Dr Dannie Mcintosh  no vision change or eye pain  Oral  no mouth pain, tongue or tooth problems  Ears  no hearing loss, ear pain, fullness, no swallowing problems  Cardiac  no CP, PND, orthopnea, edema, palpitations or syncope  Chest  no breast masses  Resp  no wheezing, chronic coughing, dyspnea  GI  no heartburn, nausea, vomiting, change in bowel habits, bleeding, hemorrhoids  Urinary  no dysuria, hematuria, flank pain, urgency, frequency  Genitals  no genital lesions, discharge, masses, ulceration, warts  Ortho  no swelling, dec ROM, myalgias  Derm  no nail abnormalities, rashes, lesions of note, hair loss  Psych  denies any anxiety or depression symptoms, no hallucinations or violent ideation  Constitutional  no wt loss, night sweats, unexplained fevers  Neuro  no focal weakness, numbness, paresthesias, incoordination, ataxia, involuntary movements  Endo - no polyuria, polydipsia, nocturia, hot flashes    Visit Vitals  /68 (BP 1 Location: Right arm, BP Patient Position: Sitting)   Pulse (!) 54   Temp 98.6 °F (37 °C) (Oral)   Ht 5' 7\" (1.702 m)   Wt 149 lb 9.6 oz (67.9 kg)   SpO2 100%   BMI 23.43 kg/m²   A&O x3  Affect normal and appropriate  HEENT --Ancteric sclerae, tympanic membranes normal, sinuses were nontender, OP benign. No thyromegaly, JVD, or bruits. PERRL, EOMI  Lungs --Clear to auscultation and percussion. Heart --Regular rate and rhythm, no murmurs, rubs, gallops, or clicks. Chest wall --Nontender to palpation. PMI normal.  Abdomen -- Soft and nontender, no hepatosplenomegaly or masses.  -- Normal genitalia, no masses. No femoral adenopathy. Rectal -- Normal tone, guaiac negative brown stool, no hemorrhoids. Prostate exam showed no asymmetry, nodularity, tenderness or enlargement  Extremities -- Without cyanosis, clubbing, edema. 2+ pulses equally and bilaterally. Back showed no cvat, neg straight leg, no si joint tenderness.   No clear soft tissue swelling, tenderness, redness, bruising    LABS  From 9/10 showed   gluc 111, cr 1.20, gfr>60,  alt 45, hba1c 5.9,                 chol 226, tg 133, hdl 77,   ldl-c 122, wbc 5.0, hb 14.3, plt 179, psa 0.4  From 11/11 showed gluc 102, cr 1.20, gfr>60,  alt 46, hba1c 6.1, ldl-p 901, chol 213, tg 87,   hdl 92,   ldl-c 104, wbc 5.4, hb 14.2, plt 214, psa 0.6, ua neg  From 12/12 showed gluc 109, cr 0.96, gfr 101, alt 23, hba1c 6.1, ldl-p 837, chol 207, tg 52,   hdl 87,   ldl-c 110,         psa 0.6  From 12/13 showed gluc 110, cr 1.06, gfr 77,   alt 21, hba1c 6.2,    chol 224, tg 54,   hdl 107, ldl-c 106, wbc 4.5, hb 14.4, plt 252, psa 0.6  From 12/14 showed gluc 112, cr 1.13, gfr>60,  alt 9,   hba1c 6.3,    chol 241, tg 71,   hdl 111, ldl-c 116, wbc 5.5, hb 15.0, plt 222, psa 0.6  From 12/15 showed gluc 78,   cr 0.98, gfr>60,  alt 36,     chol 205, tg 52,   hdl 120, ldl-c 75,   wbc 4.2, hb 14.7, plt 238,       tsh 0.89  From 12/16 showed gluc 102, cr 1.15, gfr>60,  alt 38, hba1c 5.9,    chol 252, tg 93,   hdl 127, ldl-c 106, wbc 5.0, hb 15.0, plt 228  From 12/17 showed gluc 108, cr 1.18, gfr>60,  alt 32, hba1c 6.1,    chol 229, tg 78,   hdl 125, ldl-c 88,   wbc 4.9, hb 14.1, plt 221, psa 0.8, hep c neg    Results for orders placed or performed during the hospital encounter of 12/10/18   CBC W/O DIFF   Result Value Ref Range    WBC 5.8 4.6 - 13.2 K/uL    RBC 5.03 4.70 - 5.50 M/uL    HGB 14.4 13.0 - 16.0 g/dL    HCT 45.3 36.0 - 48.0 %    MCV 90.1 74.0 - 97.0 FL    MCH 28.6 24.0 - 34.0 PG    MCHC 31.8 31.0 - 37.0 g/dL    RDW 15.3 (H) 11.6 - 14.5 %    PLATELET 970 525 - 142 K/uL    MPV 11.8 9.2 - 11.8 FL   LIPID PANEL   Result Value Ref Range    LIPID PROFILE          Cholesterol, total 220 (H) <200 MG/DL    Triglyceride 73 <150 MG/DL    HDL Cholesterol 113 (H) 40 - 60 MG/DL    LDL, calculated 92.4 0 - 100 MG/DL    VLDL, calculated 14.6 MG/DL    CHOL/HDL Ratio 1.9 0 - 5.0     METABOLIC PANEL, COMPREHENSIVE   Result Value Ref Range    Sodium 139 136 - 145 mmol/L    Potassium 4.9 3.5 - 5.5 mmol/L    Chloride 107 100 - 108 mmol/L    CO2 26 21 - 32 mmol/L    Anion gap 6 3.0 - 18 mmol/L    Glucose 90 74 - 99 mg/dL    BUN 19 (H) 7.0 - 18 MG/DL    Creatinine 1.06 0.6 - 1.3 MG/DL    BUN/Creatinine ratio 18 12 - 20      GFR est AA >60 >60 ml/min/1.73m2    GFR est non-AA >60 >60 ml/min/1.73m2    Calcium 8.9 8.5 - 10.1 MG/DL    Bilirubin, total 0.4 0.2 - 1.0 MG/DL    ALT (SGPT) 32 16 - 61 U/L    AST (SGOT) 45 (H) 15 - 37 U/L    Alk.  phosphatase 41 (L) 45 - 117 U/L    Protein, total 7.0 6.4 - 8.2 g/dL    Albumin 3.6 3.4 - 5.0 g/dL    Globulin 3.4 2.0 - 4.0 g/dL    A-G Ratio 1.1 0.8 - 1.7     PSA, DIAGNOSTIC (PROSTATE SPECIFIC AG)   Result Value Ref Range    Prostate Specific Ag 0.8 0.0 - 4.0 ng/mL Patient Active Problem List   Diagnosis Code    Prediabetes R73.03    Essential hypertension I10    Erectile dysfunction N52.9    Colon adenoma and diverticulosis Dr Deborah Botello D12.6     ASSESSMENT AND PLAN:  1. Hypertension. Continue current meds. 2.  Colon polyps. Fiber, colo 2026  3. Erectile dysfunction. Off meds  4. Prediabetes. Lifestyle and dietary measures, improved with the wt loss, check a1c next visit  5. Chronic back pain.  sched CT abd. If unrevealing, he's wiling to see ortho/spie, declined any meds today        RPE 12/19    Above conditions discussed at length and patient vocalized understanding.   All questions answered to patient satisfaction

## 2024-06-17 DIAGNOSIS — I10 ESSENTIAL HYPERTENSION: ICD-10-CM

## 2024-06-17 DIAGNOSIS — E78.5 HYPERLIPIDEMIA, UNSPECIFIED HYPERLIPIDEMIA TYPE: ICD-10-CM

## 2024-06-17 NOTE — TELEPHONE ENCOUNTER
Last OV: 12/18/2023  Next OV: 12/19/2024  Last refill:   12/19/2023 -- Atorvastatin, lisinopril  6/20/2023 -- diltiazem

## 2024-06-18 RX ORDER — DILTIAZEM HYDROCHLORIDE 180 MG/1
180 CAPSULE, EXTENDED RELEASE ORAL DAILY
Qty: 90 CAPSULE | Refills: 3 | Status: SHIPPED | OUTPATIENT
Start: 2024-06-18

## 2024-06-18 RX ORDER — ATORVASTATIN CALCIUM 20 MG/1
20 TABLET, FILM COATED ORAL DAILY
Qty: 90 TABLET | Refills: 3 | Status: SHIPPED | OUTPATIENT
Start: 2024-06-18

## 2024-06-18 RX ORDER — LISINOPRIL 20 MG/1
20 TABLET ORAL DAILY
Qty: 90 TABLET | Refills: 3 | Status: SHIPPED | OUTPATIENT
Start: 2024-06-18

## 2024-06-18 NOTE — TELEPHONE ENCOUNTER
Pt spouse called requesting refill as soon as possible pt spouse stated that he is down to 1 pill       dilTIAZem (TIAZAC) 180 MG extended release capsule     atorvastatin (LIPITOR) 20 MG tablet     lisinopril (PRINIVIL;ZESTRIL) 20 MG tablet       [facility] - [place], [place] - [facility] [address] - P [phone] - F [phone] [[id number]]

## 2024-08-14 ENCOUNTER — HOSPITAL ENCOUNTER (EMERGENCY)
Facility: HOSPITAL | Age: 69
Discharge: HOME | End: 2024-08-14
Attending: EMERGENCY MEDICINE
Payer: MEDICARE

## 2024-08-14 VITALS
DIASTOLIC BLOOD PRESSURE: 81 MMHG | TEMPERATURE: 99.1 F | BODY MASS INDEX: 25.11 KG/M2 | HEIGHT: 67 IN | WEIGHT: 160 LBS | HEART RATE: 60 BPM | RESPIRATION RATE: 16 BRPM | SYSTOLIC BLOOD PRESSURE: 151 MMHG | OXYGEN SATURATION: 98 %

## 2024-08-14 DIAGNOSIS — U07.1 2019 NOVEL CORONAVIRUS DISEASE (COVID-19): ICD-10-CM

## 2024-08-14 DIAGNOSIS — J06.9 VIRAL URI WITH COUGH: Primary | ICD-10-CM

## 2024-08-14 LAB
FLUAV AG NPH QL IA: NEGATIVE
FLUBV AG NOSE QL IA: NEGATIVE
SARS-COV-2 RDRP RESP QL NAA+PROBE: DETECTED
SOURCE: ABNORMAL

## 2024-08-14 PROCEDURE — 6370000000 HC RX 637 (ALT 250 FOR IP): Performed by: EMERGENCY MEDICINE

## 2024-08-14 PROCEDURE — 99283 EMERGENCY DEPT VISIT LOW MDM: CPT

## 2024-08-14 PROCEDURE — 87635 SARS-COV-2 COVID-19 AMP PRB: CPT

## 2024-08-14 PROCEDURE — 87804 INFLUENZA ASSAY W/OPTIC: CPT

## 2024-08-14 RX ORDER — ACETAMINOPHEN 500 MG
1000 TABLET ORAL
Status: COMPLETED | OUTPATIENT
Start: 2024-08-14 | End: 2024-08-14

## 2024-08-14 RX ADMIN — ACETAMINOPHEN 1000 MG: 500 TABLET ORAL at 06:14

## 2024-08-14 ASSESSMENT — PAIN DESCRIPTION - LOCATION: LOCATION: THROAT

## 2024-08-14 ASSESSMENT — PAIN - FUNCTIONAL ASSESSMENT
PAIN_FUNCTIONAL_ASSESSMENT: NONE - DENIES PAIN
PAIN_FUNCTIONAL_ASSESSMENT: ACTIVITIES ARE NOT PREVENTED

## 2024-08-14 ASSESSMENT — PAIN DESCRIPTION - DESCRIPTORS: DESCRIPTORS: ACHING

## 2024-08-14 NOTE — ED PROVIDER NOTES
EMERGENCY DEPARTMENT HISTORY AND PHYSICAL EXAM    6:05 AM EDT seen at this time in room 5        Date: 8/14/2024  Patient Name: Mervin Reeves    History of Presenting Illness     Chief Complaint   Patient presents with    Cough     Cough and sore throat after attending conference in [place], [place].Away for five days.         History Provided By: patient    Additional History (Context): Mervin Reeves is a 69 y.o. male presents with no contributory medical history respiratory or cardiovascular history, went to a conference and came back with a sore throat and a cough.  He is here with his wife.  Minor cough minor sore throat.  No chest tightness wheezing.  No nausea or vomiting.    PCP: Jordan Mena MD    Chief Complaint:   Duration:    Timing:    Location:   Quality:   Severity:   Modifying Factors:   Associated Symptoms:       Current Facility-Administered Medications   Medication Dose Route Frequency Provider Last Rate Last Admin    acetaminophen (TYLENOL) tablet 1,000 mg  1,000 mg Oral NOW Gentry Fonseca MD         Current Outpatient Medications   Medication Sig Dispense Refill    dilTIAZem (TIAZAC) 180 MG extended release capsule Take 1 capsule by mouth daily 90 capsule 3    atorvastatin (LIPITOR) 20 MG tablet Take 1 tablet by mouth daily 90 tablet 3    lisinopril (PRINIVIL;ZESTRIL) 20 MG tablet Take 1 tablet by mouth daily 90 tablet 3    Multiple Vitamin (MULTIVITAMIN ADULT PO) Take 1 tablet by mouth daily      ibuprofen (ADVIL;MOTRIN) 600 MG tablet ceived the following from [facility]: Outside name: ibuprofen (MOTRIN) 600 mg tablet      naproxen (NAPROSYN) 500 MG tablet Take 1 tablet by mouth 2 times daily (with meals)         Past History     Past Medical History:  Past Medical History:   Diagnosis Date    Colon polyps 2006    Dr Gallego 2006; 2/17 adenoma/hyperplastic and divertics    Degenerative disc disease, lumbar 12/2016    degen disc disease worst L4-S1    ED (erectile

## 2024-08-14 NOTE — ED TRIAGE NOTES
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 Annual conference. Returned with cough and sore throat.

## 2024-09-09 ENCOUNTER — TELEPHONE (OUTPATIENT)
Facility: CLINIC | Age: 69
End: 2024-09-09

## 2024-12-08 ENCOUNTER — TELEPHONE (OUTPATIENT)
Facility: CLINIC | Age: 69
End: 2024-12-08

## 2024-12-08 DIAGNOSIS — Z12.5 SCREENING FOR MALIGNANT NEOPLASM OF PROSTATE: Primary | ICD-10-CM

## 2024-12-08 DIAGNOSIS — E78.5 HYPERLIPIDEMIA, UNSPECIFIED HYPERLIPIDEMIA TYPE: ICD-10-CM

## 2024-12-08 DIAGNOSIS — R73.01 IFG (IMPAIRED FASTING GLUCOSE): ICD-10-CM

## 2024-12-12 ENCOUNTER — HOSPITAL ENCOUNTER (OUTPATIENT)
Facility: HOSPITAL | Age: 69
Setting detail: SPECIMEN
Discharge: HOME | End: 2024-12-15
Payer: MEDICARE

## 2024-12-12 DIAGNOSIS — E78.5 HYPERLIPIDEMIA, UNSPECIFIED HYPERLIPIDEMIA TYPE: ICD-10-CM

## 2024-12-12 DIAGNOSIS — Z12.5 SCREENING FOR MALIGNANT NEOPLASM OF PROSTATE: ICD-10-CM

## 2024-12-12 DIAGNOSIS — R73.01 IFG (IMPAIRED FASTING GLUCOSE): ICD-10-CM

## 2024-12-12 LAB
ALBUMIN SERPL-MCNC: 3.8 G/DL (ref 3.4–5)
ALBUMIN/GLOB SERPL: 1.1 (ref 0.8–1.7)
ALP SERPL-CCNC: 52 U/L (ref 45–117)
ALT SERPL-CCNC: 29 U/L (ref 16–61)
ANION GAP SERPL CALC-SCNC: 4 MMOL/L (ref 3–18)
AST SERPL-CCNC: 39 U/L (ref 10–38)
BASOPHILS # BLD: 0.1 K/UL (ref 0–0.1)
BASOPHILS NFR BLD: 1 % (ref 0–2)
BILIRUB SERPL-MCNC: 0.3 MG/DL (ref 0.2–1)
BUN SERPL-MCNC: 23 MG/DL (ref 7–18)
BUN/CREAT SERPL: 15 (ref 12–20)
CALCIUM SERPL-MCNC: 9.8 MG/DL (ref 8.5–10.1)
CHLORIDE SERPL-SCNC: 106 MMOL/L (ref 100–111)
CHOLEST SERPL-MCNC: 209 MG/DL
CO2 SERPL-SCNC: 26 MMOL/L (ref 21–32)
CREAT SERPL-MCNC: 1.51 MG/DL (ref 0.6–1.3)
DIFFERENTIAL METHOD BLD: ABNORMAL
EOSINOPHIL # BLD: 0.3 K/UL (ref 0–0.4)
EOSINOPHIL NFR BLD: 4 % (ref 0–5)
ERYTHROCYTE [DISTWIDTH] IN BLOOD BY AUTOMATED COUNT: 14.5 % (ref 11.6–14.5)
EST. AVERAGE GLUCOSE BLD GHB EST-MCNC: 126 MG/DL
GLOBULIN SER CALC-MCNC: 3.4 G/DL (ref 2–4)
GLUCOSE SERPL-MCNC: 111 MG/DL (ref 74–99)
HBA1C MFR BLD: 6 % (ref 4.2–5.6)
HCT VFR BLD AUTO: 45.8 % (ref 36–48)
HDLC SERPL-MCNC: 83 MG/DL (ref 40–60)
HDLC SERPL: 2.5 (ref 0–5)
HGB BLD-MCNC: 14.3 G/DL (ref 13–16)
IMM GRANULOCYTES # BLD AUTO: 0 K/UL (ref 0–0.04)
IMM GRANULOCYTES NFR BLD AUTO: 0 % (ref 0–0.5)
LDLC SERPL CALC-MCNC: 95.6 MG/DL (ref 0–100)
LIPID PANEL: ABNORMAL
LYMPHOCYTES # BLD: 3.2 K/UL (ref 0.9–3.6)
LYMPHOCYTES NFR BLD: 46 % (ref 21–52)
MCH RBC QN AUTO: 28.8 PG (ref 24–34)
MCHC RBC AUTO-ENTMCNC: 31.2 G/DL (ref 31–37)
MCV RBC AUTO: 92.3 FL (ref 78–100)
MONOCYTES # BLD: 1.1 K/UL (ref 0.05–1.2)
MONOCYTES NFR BLD: 15 % (ref 3–10)
NEUTS SEG # BLD: 2.3 K/UL (ref 1.8–8)
NEUTS SEG NFR BLD: 33 % (ref 40–73)
NRBC # BLD: 0 K/UL (ref 0–0.01)
NRBC BLD-RTO: 0 PER 100 WBC
PLATELET # BLD AUTO: 213 K/UL (ref 135–420)
PMV BLD AUTO: 11.5 FL (ref 9.2–11.8)
POTASSIUM SERPL-SCNC: 5.1 MMOL/L (ref 3.5–5.5)
PROT SERPL-MCNC: 7.2 G/DL (ref 6.4–8.2)
PSA SERPL-MCNC: 0.6 NG/ML (ref 0–4)
RBC # BLD AUTO: 4.96 M/UL (ref 4.35–5.65)
SODIUM SERPL-SCNC: 136 MMOL/L (ref 136–145)
TRIGL SERPL-MCNC: 152 MG/DL
VLDLC SERPL CALC-MCNC: 30.4 MG/DL
WBC # BLD AUTO: 6.9 K/UL (ref 4.6–13.2)

## 2024-12-12 PROCEDURE — G0103 PSA SCREENING: HCPCS

## 2024-12-12 PROCEDURE — 83036 HEMOGLOBIN GLYCOSYLATED A1C: CPT

## 2024-12-12 PROCEDURE — 36415 COLL VENOUS BLD VENIPUNCTURE: CPT

## 2024-12-12 PROCEDURE — 85025 COMPLETE CBC W/AUTO DIFF WBC: CPT

## 2024-12-12 PROCEDURE — 80053 COMPREHEN METABOLIC PANEL: CPT

## 2024-12-12 PROCEDURE — 80061 LIPID PANEL: CPT

## 2024-12-15 NOTE — PROGRESS NOTES
69 y.o. male who presents for evaluation.    He retired but he's busy with chores and projects at home. Not checking his bp regularly. We had inc the piero to 20 at the last visit, he never called in readings as we instructed.  Able to walk 2 mi no problem.  He had low risk NST earlier this year for some exertional sx which have resolved.     Also, we started him on lipitor after the last encounter but he never picked up.  He has declined further risk stratification in the past but wants to do it now as not really wanting to start statin     Denies any GI or Gu issues. No frothy urine or other abnormalities.  He uses otc nsaid maybe 1-2x/month    Denies polyuria, polydipsia, nocturia, vision change, not checking his sugars regularly. Weight stable     12/15/2022 12/18/2023 1/5/2024 8/14/2024 12/19/2024   Vitals        Weight - Scale 167 lb  164 lb  160 lb  160 lb  161 lb      LAST MEDICARE WELLNESS EXAM: 8/6/21, 12/15/22, 12/18/23, 12/19/24    Past Medical History:   Diagnosis Date    Colon polyps 2006    Dr Gallego 2006; 2/17 adenoma/hyperplastic and divertics    Degenerative disc disease, lumbar 12/2016    degen disc disease worst L4-S1    ED (erectile dysfunction)     H/O cardiovascular stress test 01/2024    NST low risk, ef 54%, tid 1.22    Hyperlipidemia     calculated 10 yr risk score 14.9% (12/21); declined statin and ca score    Hypertension     IFG (impaired fasting glucose)     2000s    Tinea versicolor 12/14/2021    Transaminasemia 2006    neg US and serologies     Past Surgical History:   Procedure Laterality Date    CHEST SURGERY  12/06    US thyroid negative    COLONOSCOPY      Dr. Gallego (2006) polyps; (2/22/16) polyps and divertics; (7/28/20) neg    GI  01/2019    CT abd/pelvis showed liver and kidney cysts     Social History     Socioeconomic History    Marital status:      Spouse name: Not on file    Number of children: Not on file    Years of education: Not on file    Highest education

## 2024-12-16 SDOH — HEALTH STABILITY: PHYSICAL HEALTH: ON AVERAGE, HOW MANY DAYS PER WEEK DO YOU ENGAGE IN MODERATE TO STRENUOUS EXERCISE (LIKE A BRISK WALK)?: 5 DAYS

## 2024-12-16 ASSESSMENT — LIFESTYLE VARIABLES
HAS A RELATIVE, FRIEND, DOCTOR, OR ANOTHER HEALTH PROFESSIONAL EXPRESSED CONCERN ABOUT YOUR DRINKING OR SUGGESTED YOU CUT DOWN: NO
HAVE YOU OR SOMEONE ELSE BEEN INJURED AS A RESULT OF YOUR DRINKING: NO
HOW OFTEN DURING THE LAST YEAR HAVE YOU FOUND THAT YOU WERE NOT ABLE TO STOP DRINKING ONCE YOU HAD STARTED: NEVER
HOW OFTEN DURING THE LAST YEAR HAVE YOU NEEDED AN ALCOHOLIC DRINK FIRST THING IN THE MORNING TO GET YOURSELF GOING AFTER A NIGHT OF HEAVY DRINKING: NEVER
HOW OFTEN DO YOU HAVE A DRINK CONTAINING ALCOHOL: 2-3 TIMES A WEEK
HOW OFTEN DO YOU HAVE A DRINK CONTAINING ALCOHOL: 4
HOW OFTEN DURING THE LAST YEAR HAVE YOU BEEN UNABLE TO REMEMBER WHAT HAPPENED THE NIGHT BEFORE BECAUSE YOU HAD BEEN DRINKING: NEVER
HOW OFTEN DO YOU HAVE SIX OR MORE DRINKS ON ONE OCCASION: 3
HOW OFTEN DURING THE LAST YEAR HAVE YOU FOUND THAT YOU WERE NOT ABLE TO STOP DRINKING ONCE YOU HAD STARTED: NEVER
HOW OFTEN DURING THE LAST YEAR HAVE YOU HAD A FEELING OF GUILT OR REMORSE AFTER DRINKING: NEVER
HOW OFTEN DURING THE LAST YEAR HAVE YOU FAILED TO DO WHAT WAS NORMALLY EXPECTED FROM YOU BECAUSE OF DRINKING: NEVER
HAVE YOU OR SOMEONE ELSE BEEN INJURED AS A RESULT OF YOUR DRINKING: NO
HOW MANY STANDARD DRINKS CONTAINING ALCOHOL DO YOU HAVE ON A TYPICAL DAY: 1 OR 2
HOW OFTEN DURING THE LAST YEAR HAVE YOU NEEDED AN ALCOHOLIC DRINK FIRST THING IN THE MORNING TO GET YOURSELF GOING AFTER A NIGHT OF HEAVY DRINKING: NEVER
HAS A RELATIVE, FRIEND, DOCTOR, OR ANOTHER HEALTH PROFESSIONAL EXPRESSED CONCERN ABOUT YOUR DRINKING OR SUGGESTED YOU CUT DOWN: NO
HOW OFTEN DURING THE LAST YEAR HAVE YOU BEEN UNABLE TO REMEMBER WHAT HAPPENED THE NIGHT BEFORE BECAUSE YOU HAD BEEN DRINKING: NEVER
HOW OFTEN DURING THE LAST YEAR HAVE YOU HAD A FEELING OF GUILT OR REMORSE AFTER DRINKING: NEVER
HOW OFTEN DURING THE LAST YEAR HAVE YOU FAILED TO DO WHAT WAS NORMALLY EXPECTED FROM YOU BECAUSE OF DRINKING: NEVER
HOW MANY STANDARD DRINKS CONTAINING ALCOHOL DO YOU HAVE ON A TYPICAL DAY: 1

## 2024-12-16 ASSESSMENT — PATIENT HEALTH QUESTIONNAIRE - PHQ9
SUM OF ALL RESPONSES TO PHQ QUESTIONS 1-9: 0
2. FEELING DOWN, DEPRESSED OR HOPELESS: NOT AT ALL
SUM OF ALL RESPONSES TO PHQ QUESTIONS 1-9: 0
SUM OF ALL RESPONSES TO PHQ9 QUESTIONS 1 & 2: 0
1. LITTLE INTEREST OR PLEASURE IN DOING THINGS: NOT AT ALL

## 2024-12-19 ENCOUNTER — OFFICE VISIT (OUTPATIENT)
Facility: CLINIC | Age: 69
End: 2024-12-19

## 2024-12-19 VITALS
HEART RATE: 51 BPM | DIASTOLIC BLOOD PRESSURE: 69 MMHG | TEMPERATURE: 97.9 F | HEIGHT: 67 IN | BODY MASS INDEX: 25.27 KG/M2 | WEIGHT: 161 LBS | RESPIRATION RATE: 16 BRPM | SYSTOLIC BLOOD PRESSURE: 143 MMHG | OXYGEN SATURATION: 100 %

## 2024-12-19 DIAGNOSIS — Z00.00 MEDICARE ANNUAL WELLNESS VISIT, SUBSEQUENT: Primary | ICD-10-CM

## 2024-12-19 DIAGNOSIS — D12.6 COLON ADENOMA: ICD-10-CM

## 2024-12-19 DIAGNOSIS — R79.89 ELEVATED SERUM CREATININE: ICD-10-CM

## 2024-12-19 DIAGNOSIS — R73.01 IFG (IMPAIRED FASTING GLUCOSE): ICD-10-CM

## 2024-12-19 DIAGNOSIS — Z71.89 ADVANCED CARE PLANNING/COUNSELING DISCUSSION: ICD-10-CM

## 2024-12-19 DIAGNOSIS — E78.5 HYPERLIPIDEMIA, UNSPECIFIED HYPERLIPIDEMIA TYPE: ICD-10-CM

## 2024-12-19 DIAGNOSIS — R74.01 TRANSAMINASEMIA: ICD-10-CM

## 2024-12-19 DIAGNOSIS — I10 ESSENTIAL HYPERTENSION: ICD-10-CM

## 2024-12-19 LAB
HEMOCCULT STL QL: NEGATIVE
VALID INTERNAL CONTROL: YES

## 2024-12-19 NOTE — PATIENT INSTRUCTIONS
9 Ways to Cut Back on Drinking  Maybe you've found yourself drinking more alcohol than you'd prefer. If you want to cut back, here are some ideas to try.    Think before you drink.  Do you really want a drink, or is it just a habit? If you're used to having a drink at a certain time, try doing something else then.     Look for substitutes.  Find some no-alcohol drinks that you enjoy, like flavored seltzer water, tea with honey, or tonic with a slice of lime. Or try alcohol-free beer or \"virgin\" cocktails (without the alcohol).     Drink more water.  Use water to quench your thirst. Drink a glass of water before you have any alcohol. Have another glass along with every drink or between drinks.     Shrink your drink.  For example, have a bottle of beer instead of a pint. Use a smaller glass for wine. Choose drinks with lower alcohol content (ABV%). Or use less liquor and more mixer in cocktails.     Slow down.  It's easy to drink quickly and without thinking about it. Pay attention, and make each drink last longer.     Do the math.  Total up how much you spend on alcohol each month. How much is that a year? If you cut back, what could you do with the money you save?     Take a break.  Choose a day or two each week when you won't drink at all. Notice how you feel on those days, physically and emotionally. How did you sleep? Do you feel better? Over time, add more break days.     Count calories.  Would you like to lose some weight? For some people that's a good motivator for cutting back. Figure out how many calories are in each drink. How many does that add up to in a day? In a week? In a month?     Practice saying no.  Be ready when someone offers you a drink. Try: \"Thanks, I've had enough.\" Or \"Thanks, but I'm cutting back.\" Or \"No, thanks. I feel better when I drink less.\"   Current as of: November 15, 2023  Content Version: 14.2  © 2024 [employer].   Care instructions adapted under license by [facility]

## 2024-12-19 NOTE — ACP (ADVANCE CARE PLANNING)
Advance Care Planning     Advance Care Planning (ACP) Physician/NP/PA Conversation    Date of Conversation: 12/19/2024  Conducted with: Patient with Decision Making Capacity    Healthcare Decision Maker:      Primary Decision Maker: Eleanor Reeves - Spouse - [phone]    Click here to complete Healthcare Decision Makers including selection of the Healthcare Decision Maker Relationship (ie \"Primary\")  Today we documented Decision Maker(s) consistent with Legal Next of Kin hierarchy.    Care Preferences:    Hospitalization:  \"If your health worsens and it becomes clear that your chance of recovery is unlikely, what would be your preference regarding hospitalization?\"  The patient would prefer hospitalization.    Ventilation:  \"If you were unable to breath on your own and your chance of recovery was unlikely, what would be your preference about the use of a ventilator (breathing machine) if it was available to you?\"  The patient would desire the use of a ventilator.    Resuscitation:  \"In the event your heart stopped as a result of an underlying serious health condition, would you want attempts made to restart your heart, or would you prefer a natural death?\"  Yes, attempt to resuscitate.    ventilation preferences, hospitalization preferences, and resuscitation preferences    Conversation Outcomes / Follow-Up Plan:  ACP in process - information provided, considering goals and options  Reviewed DNR/DNI and patient elects Full Code (Attempt Resuscitation)    Length of Voluntary ACP Conversation in minutes:  16 minutes    DEONNA OWEN MD

## 2024-12-19 NOTE — PROGRESS NOTES
Medicare Annual Wellness Visit    Mervin Reeves is here for Medicare AWV    Assessment & Plan   Hyperlipidemia, unspecified hyperlipidemia type  -     CT CARDIAC CALCIUM SCORING; Future  Transaminasemia  -     US ABDOMEN COMPLETE; Future  -     Hepatitis B Surface Antigen; Future  -     Hepatitis C Antibody; Future  -     Iron and TIBC; Future  -     Mitochondrial antibodies, M2; Future  -     Ferritin; Future  -     Ceruloplasmin; Future  -     Antinuclear AB Screen IFA; Future  Elevated serum creatinine  -     US ABDOMEN COMPLETE; Future  -     AMB POC URINALYSIS DIP STICK AUTO W/ MICRO  Colon adenoma  -     AMB POC FECAL BLOOD, OCCULT, QL 1 CARD  -     External Referral To Gastroenterology  Advanced care planning/counseling discussion  Essential hypertension  IFG (impaired fasting glucose)  Medicare annual wellness visit, subsequent    Recommendations for Preventive Services Due: see orders and patient instructions/AVS.  Recommended screening schedule for the next 5-10 years is provided to the patient in written form: see Patient Instructions/AVS.     Return in 1 year (on 12/19/2025) for Medicare AWV.     Subjective       Patient's complete Health Risk Assessment and screening values have been reviewed and are found in Flowsheets. The following problems were reviewed today and where indicated follow up appointments were made and/or referrals ordered.    Positive Risk Factor Screenings with Interventions:       Alcohol Screening:  AUDIT-C Score: 5  AUDIT Total Score: 5  Total Score Interpretation: 0-7 suggests low risk alcohol consumption but assess individual risks   Interventions:  Patient declined any further intervention or treatment                      Advanced Directives:  Do you have a Living Will?: (!) No    Intervention:  see ACP note                     Objective   Vitals:    12/19/24 0902 12/19/24 0918   BP: (!) 147/75 (!) 143/69   Site: Left Upper Arm Right Upper Arm   Position: Sitting Sitting

## 2024-12-19 NOTE — PROGRESS NOTES
Mervin Reeves presents today for   Chief Complaint   Patient presents with    Medicare AWV       \"Have you been to the ER, urgent care clinic since your last visit?  Hospitalized since your last visit?\"    NO    “Have you seen or consulted any other health care providers outside of [facility] since your last visit?”    YES - When: approximately 8 months ago.  [facility].

## 2025-01-02 ENCOUNTER — APPOINTMENT (OUTPATIENT)
Facility: HOSPITAL | Age: 70
DRG: 392 | End: 2025-01-02
Payer: MEDICARE

## 2025-01-02 ENCOUNTER — HOSPITAL ENCOUNTER (INPATIENT)
Facility: HOSPITAL | Age: 70
LOS: 1 days | Discharge: HOME | DRG: 392 | End: 2025-01-03
Attending: EMERGENCY MEDICINE | Admitting: STUDENT IN AN ORGANIZED HEALTH CARE EDUCATION/TRAINING PROGRAM
Payer: MEDICARE

## 2025-01-02 DIAGNOSIS — K56.609 SMALL BOWEL OBSTRUCTION (HCC): Primary | ICD-10-CM

## 2025-01-02 PROBLEM — K52.9 COLITIS: Status: ACTIVE | Noted: 2025-01-02

## 2025-01-02 LAB
ALBUMIN SERPL-MCNC: 3.8 G/DL (ref 3.4–5)
ALBUMIN/GLOB SERPL: 1.1 (ref 0.8–1.7)
ALP SERPL-CCNC: 51 U/L (ref 45–117)
ALT SERPL-CCNC: 23 U/L (ref 16–61)
ANION GAP SERPL CALC-SCNC: 8 MMOL/L (ref 3–18)
AST SERPL-CCNC: 43 U/L (ref 10–38)
BASOPHILS # BLD: 0.1 K/UL (ref 0–0.1)
BASOPHILS NFR BLD: 1 % (ref 0–2)
BILIRUB SERPL-MCNC: 0.6 MG/DL (ref 0.2–1)
BUN SERPL-MCNC: 18 MG/DL (ref 7–18)
BUN/CREAT SERPL: 13 (ref 12–20)
CALCIUM SERPL-MCNC: 10 MG/DL (ref 8.5–10.1)
CHLORIDE SERPL-SCNC: 100 MMOL/L (ref 100–111)
CO2 SERPL-SCNC: 29 MMOL/L (ref 21–32)
CREAT SERPL-MCNC: 1.4 MG/DL (ref 0.6–1.3)
CRP SERPL-MCNC: 0.9 MG/DL (ref 0–0.3)
DIFFERENTIAL METHOD BLD: ABNORMAL
EKG ATRIAL RATE: 50 BPM
EKG DIAGNOSIS: NORMAL
EKG P AXIS: 39 DEGREES
EKG P-R INTERVAL: 156 MS
EKG Q-T INTERVAL: 450 MS
EKG QRS DURATION: 80 MS
EKG QTC CALCULATION (BAZETT): 410 MS
EKG R AXIS: -17 DEGREES
EKG T AXIS: 21 DEGREES
EKG VENTRICULAR RATE: 50 BPM
EOSINOPHIL # BLD: 0.4 K/UL (ref 0–0.4)
EOSINOPHIL NFR BLD: 4 % (ref 0–5)
ERYTHROCYTE [DISTWIDTH] IN BLOOD BY AUTOMATED COUNT: 14.2 % (ref 11.6–14.5)
ERYTHROCYTE [SEDIMENTATION RATE] IN BLOOD: 24 MM/HR (ref 0–20)
GLOBULIN SER CALC-MCNC: 3.4 G/DL (ref 2–4)
GLUCOSE SERPL-MCNC: 120 MG/DL (ref 74–99)
HCT VFR BLD AUTO: 42.7 % (ref 36–48)
HGB BLD-MCNC: 14.3 G/DL (ref 13–16)
IMM GRANULOCYTES # BLD AUTO: 0 K/UL (ref 0–0.04)
IMM GRANULOCYTES NFR BLD AUTO: 0 % (ref 0–0.5)
LACTATE BLD-SCNC: 1.29 MMOL/L (ref 0.4–2)
LIPASE SERPL-CCNC: 33 U/L (ref 13–75)
LYMPHOCYTES # BLD: 2.7 K/UL (ref 0.9–3.6)
LYMPHOCYTES NFR BLD: 31 % (ref 21–52)
MCH RBC QN AUTO: 29.3 PG (ref 24–34)
MCHC RBC AUTO-ENTMCNC: 33.5 G/DL (ref 31–37)
MCV RBC AUTO: 87.5 FL (ref 78–100)
MONOCYTES # BLD: 1.1 K/UL (ref 0.05–1.2)
MONOCYTES NFR BLD: 12 % (ref 3–10)
NEUTS SEG # BLD: 4.3 K/UL (ref 1.8–8)
NEUTS SEG NFR BLD: 51 % (ref 40–73)
NRBC # BLD: 0 K/UL (ref 0–0.01)
NRBC BLD-RTO: 0 PER 100 WBC
PLATELET # BLD AUTO: 200 K/UL (ref 135–420)
PMV BLD AUTO: 11 FL (ref 9.2–11.8)
POTASSIUM SERPL-SCNC: 4.9 MMOL/L (ref 3.5–5.5)
PROCALCITONIN SERPL-MCNC: <0.05 NG/ML
PROT SERPL-MCNC: 7.2 G/DL (ref 6.4–8.2)
RBC # BLD AUTO: 4.88 M/UL (ref 4.35–5.65)
SODIUM SERPL-SCNC: 137 MMOL/L (ref 136–145)
TROPONIN I SERPL HS-MCNC: 4 NG/L (ref 0–78)
WBC # BLD AUTO: 8.5 K/UL (ref 4.6–13.2)

## 2025-01-02 PROCEDURE — 96375 TX/PRO/DX INJ NEW DRUG ADDON: CPT

## 2025-01-02 PROCEDURE — 6360000004 HC RX CONTRAST MEDICATION: Performed by: EMERGENCY MEDICINE

## 2025-01-02 PROCEDURE — 84145 PROCALCITONIN (PCT): CPT

## 2025-01-02 PROCEDURE — 97161 PT EVAL LOW COMPLEX 20 MIN: CPT

## 2025-01-02 PROCEDURE — 93010 ELECTROCARDIOGRAM REPORT: CPT | Performed by: INTERNAL MEDICINE

## 2025-01-02 PROCEDURE — 36415 COLL VENOUS BLD VENIPUNCTURE: CPT

## 2025-01-02 PROCEDURE — 99222 1ST HOSP IP/OBS MODERATE 55: CPT | Performed by: SURGERY

## 2025-01-02 PROCEDURE — 93005 ELECTROCARDIOGRAM TRACING: CPT | Performed by: EMERGENCY MEDICINE

## 2025-01-02 PROCEDURE — 96374 THER/PROPH/DIAG INJ IV PUSH: CPT

## 2025-01-02 PROCEDURE — 84484 ASSAY OF TROPONIN QUANT: CPT

## 2025-01-02 PROCEDURE — 99285 EMERGENCY DEPT VISIT HI MDM: CPT

## 2025-01-02 PROCEDURE — 71045 X-RAY EXAM CHEST 1 VIEW: CPT

## 2025-01-02 PROCEDURE — 6360000002 HC RX W HCPCS: Performed by: EMERGENCY MEDICINE

## 2025-01-02 PROCEDURE — 99223 1ST HOSP IP/OBS HIGH 75: CPT | Performed by: STUDENT IN AN ORGANIZED HEALTH CARE EDUCATION/TRAINING PROGRAM

## 2025-01-02 PROCEDURE — 1100000000 HC RM PRIVATE

## 2025-01-02 PROCEDURE — 6360000002 HC RX W HCPCS: Performed by: STUDENT IN AN ORGANIZED HEALTH CARE EDUCATION/TRAINING PROGRAM

## 2025-01-02 PROCEDURE — 74177 CT ABD & PELVIS W/CONTRAST: CPT

## 2025-01-02 PROCEDURE — 85025 COMPLETE CBC W/AUTO DIFF WBC: CPT

## 2025-01-02 PROCEDURE — 80053 COMPREHEN METABOLIC PANEL: CPT

## 2025-01-02 PROCEDURE — 85652 RBC SED RATE AUTOMATED: CPT

## 2025-01-02 PROCEDURE — 2500000003 HC RX 250 WO HCPCS: Performed by: STUDENT IN AN ORGANIZED HEALTH CARE EDUCATION/TRAINING PROGRAM

## 2025-01-02 PROCEDURE — 83690 ASSAY OF LIPASE: CPT

## 2025-01-02 PROCEDURE — 2580000003 HC RX 258: Performed by: STUDENT IN AN ORGANIZED HEALTH CARE EDUCATION/TRAINING PROGRAM

## 2025-01-02 PROCEDURE — 83605 ASSAY OF LACTIC ACID: CPT

## 2025-01-02 PROCEDURE — 86140 C-REACTIVE PROTEIN: CPT

## 2025-01-02 RX ORDER — LEVOFLOXACIN 5 MG/ML
500 INJECTION, SOLUTION INTRAVENOUS ONCE
Status: COMPLETED | OUTPATIENT
Start: 2025-01-03 | End: 2025-01-03

## 2025-01-02 RX ORDER — METRONIDAZOLE 500 MG/100ML
500 INJECTION, SOLUTION INTRAVENOUS EVERY 8 HOURS
Status: DISCONTINUED | OUTPATIENT
Start: 2025-01-02 | End: 2025-01-03 | Stop reason: HOSPADM

## 2025-01-02 RX ORDER — ACETAMINOPHEN 650 MG/1
650 SUPPOSITORY RECTAL EVERY 6 HOURS PRN
Status: DISCONTINUED | OUTPATIENT
Start: 2025-01-02 | End: 2025-01-03 | Stop reason: HOSPADM

## 2025-01-02 RX ORDER — ONDANSETRON 4 MG/1
4 TABLET, ORALLY DISINTEGRATING ORAL EVERY 8 HOURS PRN
Status: DISCONTINUED | OUTPATIENT
Start: 2025-01-02 | End: 2025-01-03 | Stop reason: HOSPADM

## 2025-01-02 RX ORDER — ONDANSETRON 2 MG/ML
4 INJECTION INTRAMUSCULAR; INTRAVENOUS EVERY 6 HOURS PRN
Status: DISCONTINUED | OUTPATIENT
Start: 2025-01-02 | End: 2025-01-03 | Stop reason: HOSPADM

## 2025-01-02 RX ORDER — HYDRALAZINE HYDROCHLORIDE 20 MG/ML
10 INJECTION INTRAMUSCULAR; INTRAVENOUS EVERY 6 HOURS PRN
Status: DISCONTINUED | OUTPATIENT
Start: 2025-01-02 | End: 2025-01-03 | Stop reason: HOSPADM

## 2025-01-02 RX ORDER — LEVOFLOXACIN 5 MG/ML
250 INJECTION, SOLUTION INTRAVENOUS EVERY 24 HOURS
Status: DISCONTINUED | OUTPATIENT
Start: 2025-01-04 | End: 2025-01-03

## 2025-01-02 RX ORDER — IOPAMIDOL 612 MG/ML
100 INJECTION, SOLUTION INTRAVASCULAR
Status: COMPLETED | OUTPATIENT
Start: 2025-01-02 | End: 2025-01-02

## 2025-01-02 RX ORDER — MORPHINE SULFATE 4 MG/ML
4 INJECTION, SOLUTION INTRAMUSCULAR; INTRAVENOUS EVERY 4 HOURS PRN
Status: DISCONTINUED | OUTPATIENT
Start: 2025-01-02 | End: 2025-01-03

## 2025-01-02 RX ORDER — ACETAMINOPHEN 325 MG/1
650 TABLET ORAL EVERY 6 HOURS PRN
Status: DISCONTINUED | OUTPATIENT
Start: 2025-01-02 | End: 2025-01-03 | Stop reason: HOSPADM

## 2025-01-02 RX ORDER — POTASSIUM CHLORIDE 1500 MG/1
40 TABLET, EXTENDED RELEASE ORAL PRN
Status: DISCONTINUED | OUTPATIENT
Start: 2025-01-02 | End: 2025-01-03 | Stop reason: HOSPADM

## 2025-01-02 RX ORDER — MORPHINE SULFATE 2 MG/ML
2 INJECTION, SOLUTION INTRAMUSCULAR; INTRAVENOUS EVERY 4 HOURS PRN
Status: DISCONTINUED | OUTPATIENT
Start: 2025-01-02 | End: 2025-01-03

## 2025-01-02 RX ORDER — SODIUM CHLORIDE 9 MG/ML
INJECTION, SOLUTION INTRAVENOUS PRN
Status: DISCONTINUED | OUTPATIENT
Start: 2025-01-02 | End: 2025-01-03 | Stop reason: HOSPADM

## 2025-01-02 RX ORDER — CIPROFLOXACIN 2 MG/ML
400 INJECTION, SOLUTION INTRAVENOUS EVERY 12 HOURS
Status: DISCONTINUED | OUTPATIENT
Start: 2025-01-02 | End: 2025-01-02

## 2025-01-02 RX ORDER — ONDANSETRON 2 MG/ML
4 INJECTION INTRAMUSCULAR; INTRAVENOUS
Status: DISPENSED | OUTPATIENT
Start: 2025-01-02 | End: 2025-01-02

## 2025-01-02 RX ORDER — POTASSIUM CHLORIDE 7.45 MG/ML
10 INJECTION INTRAVENOUS PRN
Status: DISCONTINUED | OUTPATIENT
Start: 2025-01-02 | End: 2025-01-03 | Stop reason: HOSPADM

## 2025-01-02 RX ORDER — MORPHINE SULFATE 4 MG/ML
4 INJECTION, SOLUTION INTRAMUSCULAR; INTRAVENOUS
Status: COMPLETED | OUTPATIENT
Start: 2025-01-02 | End: 2025-01-02

## 2025-01-02 RX ORDER — POLYETHYLENE GLYCOL 3350 17 G/17G
17 POWDER, FOR SOLUTION ORAL DAILY PRN
Status: DISCONTINUED | OUTPATIENT
Start: 2025-01-02 | End: 2025-01-03 | Stop reason: HOSPADM

## 2025-01-02 RX ORDER — SODIUM CHLORIDE 0.9 % (FLUSH) 0.9 %
5-40 SYRINGE (ML) INJECTION EVERY 12 HOURS SCHEDULED
Status: DISCONTINUED | OUTPATIENT
Start: 2025-01-02 | End: 2025-01-03 | Stop reason: HOSPADM

## 2025-01-02 RX ORDER — SODIUM CHLORIDE 9 MG/ML
INJECTION, SOLUTION INTRAVENOUS CONTINUOUS
Status: DISPENSED | OUTPATIENT
Start: 2025-01-02 | End: 2025-01-03

## 2025-01-02 RX ORDER — LEVOFLOXACIN 5 MG/ML
500 INJECTION, SOLUTION INTRAVENOUS EVERY 24 HOURS
Status: DISCONTINUED | OUTPATIENT
Start: 2025-01-03 | End: 2025-01-02 | Stop reason: DRUGHIGH

## 2025-01-02 RX ORDER — MAGNESIUM SULFATE IN WATER 40 MG/ML
2000 INJECTION, SOLUTION INTRAVENOUS PRN
Status: DISCONTINUED | OUTPATIENT
Start: 2025-01-02 | End: 2025-01-03 | Stop reason: HOSPADM

## 2025-01-02 RX ORDER — SODIUM CHLORIDE 0.9 % (FLUSH) 0.9 %
5-40 SYRINGE (ML) INJECTION PRN
Status: DISCONTINUED | OUTPATIENT
Start: 2025-01-02 | End: 2025-01-03 | Stop reason: HOSPADM

## 2025-01-02 RX ORDER — METRONIDAZOLE 500 MG/100ML
500 INJECTION, SOLUTION INTRAVENOUS
Status: COMPLETED | OUTPATIENT
Start: 2025-01-02 | End: 2025-01-02

## 2025-01-02 RX ORDER — MORPHINE SULFATE 4 MG/ML
4 INJECTION, SOLUTION INTRAMUSCULAR; INTRAVENOUS
Status: DISCONTINUED | OUTPATIENT
Start: 2025-01-02 | End: 2025-01-02

## 2025-01-02 RX ORDER — ONDANSETRON 2 MG/ML
4 INJECTION INTRAMUSCULAR; INTRAVENOUS EVERY 6 HOURS PRN
Status: DISCONTINUED | OUTPATIENT
Start: 2025-01-02 | End: 2025-01-02

## 2025-01-02 RX ADMIN — SODIUM CHLORIDE: 9 INJECTION, SOLUTION INTRAVENOUS at 22:03

## 2025-01-02 RX ADMIN — SODIUM CHLORIDE, PRESERVATIVE FREE 10 ML: 5 INJECTION INTRAVENOUS at 21:58

## 2025-01-02 RX ADMIN — CIPROFLOXACIN 400 MG: 2 INJECTION, SOLUTION INTRAVENOUS at 19:39

## 2025-01-02 RX ADMIN — MORPHINE SULFATE 4 MG: 4 INJECTION, SOLUTION INTRAMUSCULAR; INTRAVENOUS at 02:12

## 2025-01-02 RX ADMIN — METRONIDAZOLE 500 MG: 500 INJECTION, SOLUTION INTRAVENOUS at 10:09

## 2025-01-02 RX ADMIN — SODIUM CHLORIDE 40 MG: 9 INJECTION INTRAMUSCULAR; INTRAVENOUS; SUBCUTANEOUS at 16:20

## 2025-01-02 RX ADMIN — CIPROFLOXACIN 400 MG: 2 INJECTION, SOLUTION INTRAVENOUS at 07:04

## 2025-01-02 RX ADMIN — IOPAMIDOL 100 ML: 612 INJECTION, SOLUTION INTRAVENOUS at 03:49

## 2025-01-02 RX ADMIN — METRONIDAZOLE 500 MG: 500 INJECTION, SOLUTION INTRAVENOUS at 21:58

## 2025-01-02 RX ADMIN — ONDANSETRON 4 MG: 2 INJECTION, SOLUTION INTRAMUSCULAR; INTRAVENOUS at 02:12

## 2025-01-02 RX ADMIN — SODIUM CHLORIDE, PRESERVATIVE FREE 10 ML: 5 INJECTION INTRAVENOUS at 10:09

## 2025-01-02 RX ADMIN — SODIUM CHLORIDE: 9 INJECTION, SOLUTION INTRAVENOUS at 10:03

## 2025-01-02 ASSESSMENT — PAIN DESCRIPTION - ORIENTATION
ORIENTATION: UPPER

## 2025-01-02 ASSESSMENT — PAIN - FUNCTIONAL ASSESSMENT
PAIN_FUNCTIONAL_ASSESSMENT: ACTIVITIES ARE NOT PREVENTED
PAIN_FUNCTIONAL_ASSESSMENT: ACTIVITIES ARE NOT PREVENTED
PAIN_FUNCTIONAL_ASSESSMENT: 0-10
PAIN_FUNCTIONAL_ASSESSMENT: ACTIVITIES ARE NOT PREVENTED
PAIN_FUNCTIONAL_ASSESSMENT: ACTIVITIES ARE NOT PREVENTED
PAIN_FUNCTIONAL_ASSESSMENT: 0-10

## 2025-01-02 ASSESSMENT — PAIN SCALES - GENERAL
PAINLEVEL_OUTOF10: 0
PAINLEVEL_OUTOF10: 1
PAINLEVEL_OUTOF10: 4
PAINLEVEL_OUTOF10: 2
PAINLEVEL_OUTOF10: 9
PAINLEVEL_OUTOF10: 0
PAINLEVEL_OUTOF10: 9

## 2025-01-02 ASSESSMENT — PAIN DESCRIPTION - DESCRIPTORS
DESCRIPTORS: ACHING

## 2025-01-02 ASSESSMENT — PAIN DESCRIPTION - LOCATION
LOCATION: ABDOMEN

## 2025-01-02 ASSESSMENT — ENCOUNTER SYMPTOMS
NAUSEA: 1
RESPIRATORY NEGATIVE: 1
SHORTNESS OF BREATH: 0
VOMITING: 0
ABDOMINAL PAIN: 1

## 2025-01-02 ASSESSMENT — LIFESTYLE VARIABLES
HOW MANY STANDARD DRINKS CONTAINING ALCOHOL DO YOU HAVE ON A TYPICAL DAY: PATIENT DOES NOT DRINK
HOW OFTEN DO YOU HAVE A DRINK CONTAINING ALCOHOL: NEVER

## 2025-01-02 ASSESSMENT — PAIN DESCRIPTION - PAIN TYPE
TYPE: ACUTE PAIN

## 2025-01-02 ASSESSMENT — PAIN DESCRIPTION - FREQUENCY: FREQUENCY: CONTINUOUS

## 2025-01-02 ASSESSMENT — PAIN DESCRIPTION - ONSET: ONSET: ON-GOING

## 2025-01-02 NOTE — ED NOTES
Pt resting quietly on stretcher; family member at bedside. Awaiting CT results. Bed in lowest position, SR up x 2 for safety, call bell within easy reach.

## 2025-01-02 NOTE — ED NOTES
Pt is resting quietly on stretcher, awake but appears drowsy. Family member at bedside. Pt reports \"much\" improvement in upper abdominal pain; now 4/10. Pt is awaiting CT. Pt was offered a warm blanket which he declined at this time. Bed is in lowest position, SR up x 2 for safety, call bell within easy reach. Instructed to call for assistance as needed; he verbalized understanding.

## 2025-01-02 NOTE — H&P
Problem: Fluid and Electrolyte Imbalance (Acute Kidney Injury/Impairment)  Goal: Fluid and Electrolyte Balance  Outcome: Ongoing, Progressing     Problem: Adult Inpatient Plan of Care  Goal: Plan of Care Review  Outcome: Ongoing, Progressing     Problem: Infection  Goal: Absence of Infection Signs and Symptoms  Outcome: Ongoing, Progressing     Problem: Skin Injury Risk Increased  Goal: Skin Health and Integrity  Outcome: Ongoing, Progressing      Hospitalist Admission History and Physical    NAME:  Mervin Reeves   :   1955   MRN:   [id number]     PCP:  Jordan Mena MD  Date/Time:  2025 1:36 PM  Subjective:   CHIEF COMPLAINT:  epigastric pain    HISTORY OF PRESENT ILLNESS:     Mervin is a 69 y.o.   male with pmh of htn and hyperlipidemia presents with epigastric pain. Pain started yesterday morning. It has been constant. He rates it a 10/10. He has slight nausea. He denied diarrhea, constipation, vomiting, headache. His PCP advised that he takes statin for hyperlipidemia, but patient refuses. He is waiting for calcium score scan before starting statin. He is independent with all ADLs. Lives with his wife. Smoked cigarettes in the 80s. He drinks a 6 pack of beer a week. Denied illicit drug use.    Confirmed code status with patient and wife multiple times. He wishes to be DNR/DNI.        Past Medical History:   Diagnosis Date    Colon polyps     Dr Gallego ;  adenoma/hyperplastic and divertics    Degenerative disc disease, lumbar 2016    degen disc disease worst L4-S1    ED (erectile dysfunction)     H/O cardiovascular stress test 2024    NST low risk, ef 54%, tid 1.22    Hyperlipidemia     calculated 10 yr risk score 14.9% (); declined statin and ca score    Hypertension     IFG (impaired fasting glucose)         Tinea versicolor 2021    Transaminasemia 2006    neg US and serologies        Past Surgical History:   Procedure Laterality Date    CHEST SURGERY      US thyroid negative    COLONOSCOPY      Dr. Gallego () polyps; (16) polyps and divertics; (20) neg    GI  2019    CT abd/pelvis showed liver and kidney cysts       Social History     Tobacco Use    Smoking status: Former     Current packs/day: 0.00     Average packs/day: 0.5 packs/day for 6.0 years (3.0 ttl pk-yrs)     Types: Cigarettes     Start date: 1974     Quit date: 1980     Years since quittin.0

## 2025-01-02 NOTE — PLAN OF CARE
PHYSICAL THERAPY EVALUATION/DISCHARGE    Patient: Mervin Reeves (69 y.o. male)  Date: 1/2/2025  Primary Diagnosis: SBO (small bowel obstruction) ([facility]) [K56.609]  Colitis [K52.9]       Precautions: None  PLOF: independent without an assistive device     ASSESSMENT AND RECOMMENDATIONS:  Pt is in bed and agreeable to PT evaluation.  During evaluation patient was independent with all aspects of functional mobility without an assistive device.  Educated patient on ambulating throughout the day as long as he's feeling well and he verbalized understanding.    Patient does not require further skilled physical therapy intervention at this level of care.    Further Equipment Recommendations for Discharge: None    AMPAC: AM-PAC Inpatient Mobility Raw Score : 24      At this time and based on an AM-PAC score, no further PT is recommended upon discharge.  Recommend patient returns to prior setting with prior services.    This [facility] score should be considered in conjunction with interdisciplinary team recommendations to determine the most appropriate discharge setting. Patient's social support, diagnosis, medical stability, and prior level of function should also be taken into consideration.     SUBJECTIVE:   Patient stated “I'm feeling better.”    OBJECTIVE DATA SUMMARY:     Past Medical History:   Diagnosis Date    Colon polyps 2006    Dr Gallego 2006; 2/17 adenoma/hyperplastic and divertics    Degenerative disc disease, lumbar 12/2016    degen disc disease worst L4-S1    ED (erectile dysfunction)     H/O cardiovascular stress test 01/2024    NST low risk, ef 54%, tid 1.22    Hyperlipidemia     calculated 10 yr risk score 14.9% (12/21); declined statin and ca score    Hypertension     IFG (impaired fasting glucose)     2000s    Tinea versicolor 12/14/2021    Transaminasemia 2006    neg US and serologies     Past Surgical History:   Procedure Laterality Date    CHEST SURGERY  12/06    US thyroid negative    COLONOSCOPY        Julián (2006) polyps; (2/22/16) polyps and divertics; (7/28/20) neg    GI  01/2019    CT abd/pelvis showed liver and kidney cysts       Home Situation:  Social/Functional History  Lives With: Spouse  Type of Home: House  Home Layout: One level  Home Access: Stairs to enter with rails  Entrance Stairs - Number of Steps: 4  Entrance Stairs - Rails: Both  Bathroom Shower/Tub: Tub/Shower unit  Bathroom Toilet: Standard  Bathroom Equipment: Grab bars in shower  Bathroom Accessibility: Accessible  Home Equipment: None  Receives Help From: Family  Prior Level of Assist for ADLs: Independent  Prior Level of Assist for Homemaking: Independent  Homemaking Responsibilities: Yes  Prior Level of Assist for Transfers: Independent  Active : Yes  Mode of Transportation: Truck  Education:  (not applicable)  Occupation: Retired  Type of Occupation:  (not applicable)  Critical Behavior:   Cooperative, motivated     Strength:    Strength: Within functional limits    Tone & Sensation:   Tone: Normal  Sensation: Intact    Range Of Motion:  AROM: Within functional limits  PROM: Within functional limits    Functional Mobility:  Bed Mobility:  Bed Mobility Training  Bed Mobility Training: Yes  Rolling: Independent  Supine to Sit: Independent  Sit to Supine: Independent  Transfers:  Transfer Training  Transfer Training: Yes  Sit to Stand: Independent  Stand to Sit: Independent  Balance:   Balance  Sitting: Intact  Standing: Intact;Without support  Ambulation/Gait Training:  Gait  Gait Training: Yes  Overall Level of Assistance: Independent  Distance (ft): 200 Feet  Assistive Device: None  Pain:  Intensity Pre-treatment: 2/10   Intensity Post-treatment: 2/10  Scale: Numeric Rating Scale  Location: Abdomen  Quality: Aching  Intervention(s): Repositioning  and Rest      Activity Tolerance:   Activity Tolerance: Patient tolerated evaluation without incident  Please refer to the flowsheet for vital signs taken during this

## 2025-01-02 NOTE — ED TRIAGE NOTES
Patient A/O x 4, presented to the ED with complaint of epigastric abdominal pain x 1 day. Patient denies chest pain, N/V, abdominal pain, SOB and other complaints.

## 2025-01-02 NOTE — ED TRIAGE NOTES
`[facility] EM[facility] DEPT  eMERGENCY dEPARTMENT eNCOUnter      Pt Name: Mervin Reeves  MRN: [id number]  Birthdate 1955 of evaluation: 1/2/2025  Provider:Abisai Austin MD    CHIEF COMPLAINT       HPI    Mervin Reeves is a 69 y.o. male C/O having     ROS  Review of Systems   Constitutional: Negative.    HENT: Negative.     Respiratory: Negative.     Cardiovascular: Negative.    Gastrointestinal:  Positive for abdominal pain and nausea. Negative for vomiting.   Genitourinary: Negative.    Musculoskeletal: Negative.    Neurological: Negative.    All other systems reviewed and are negative.      Except as noted above the remainder of the review of systems was reviewed and negative.       PAST MEDICAL HISTORY     Past Medical History:   Diagnosis Date    Colon polyps 2006    Dr Gallego 2006; 2/17 adenoma/hyperplastic and divertics    Degenerative disc disease, lumbar 12/2016    degen disc disease worst L4-S1    ED (erectile dysfunction)     H/O cardiovascular stress test 01/2024    NST low risk, ef 54%, tid 1.22    Hyperlipidemia     calculated 10 yr risk score 14.9% (12/21); declined statin and ca score    Hypertension     IFG (impaired fasting glucose)     2000s    Tinea versicolor 12/14/2021    Transaminasemia 2006    neg US and serologies         SURGICAL HISTORY       Past Surgical History:   Procedure Laterality Date    CHEST SURGERY  12/06    US thyroid negative    COLONOSCOPY      Dr. Gallego (2006) polyps; (2/22/16) polyps and divertics; (7/28/20) neg    GI  01/2019    CT abd/pelvis showed liver and kidney cysts         CURRENTMEDICATIONS       Previous Medications    ATORVASTATIN (LIPITOR) 20 MG TABLET    Take 1 tablet by mouth daily    DILTIAZEM (TIAZAC) 180 MG EXTENDED RELEASE CAPSULE    Take 1 capsule by mouth daily    IBUPROFEN (ADVIL;MOTRIN) 600 MG TABLET    ceived the following from [facility]: Outside name: ibuprofen (MOTRIN) 600 mg tablet    LISINOPRIL (PRINIVIL;ZESTRIL) 20 MG TABLET          PHYSICAL EXAM       ED Triage Vitals [01/02/25 0033]   BP Systolic BP Percentile Diastolic BP Percentile Temp Temp Source Pulse Respirations SpO2   (!) 163/76 -- -- 98 °F (36.7 °C) Oral 59 18 99 %      Height Weight - Scale         -- 72.6 kg (160 lb)             Physical Exam  Constitutional:       Appearance: He is well-developed.   HENT:      Head: Normocephalic.   Cardiovascular:      Rate and Rhythm: Normal rate and regular rhythm.   Pulmonary:      Effort: Pulmonary effort is normal.      Breath sounds: Normal breath sounds.   Abdominal:      General: Bowel sounds are decreased. There is distension (mild). There is no abdominal bruit.      Palpations: There is no fluid wave.      Tenderness: There is abdominal tenderness ((+) tenderness in epigastric area).      Hernia: A hernia is present. Hernia is present in the umbilical area.   Skin:     General: Skin is warm and dry.   Neurological:      General: No focal deficit present.      Mental Status: He is alert.   Psychiatric:         Mood and Affect: Anxious: small umbilical hernia.         Recent Results (from the past 24 hour(s))   EKG 12 Lead    Collection Time: 01/02/25 12:43 AM   Result Value Ref Range    Ventricular Rate 50 BPM    Atrial Rate 50 BPM    P-R Interval 156 ms    QRS Duration 80 ms    Q-T Interval 450 ms    QTc Calculation (Bazett) 410 ms    P Axis 39 degrees    R Axis -17 degrees    T Axis 21 degrees    Diagnosis       Sinus bradycardia  Minimal voltage criteria for LVH, may be normal variant ( R in aVL )  Borderline ECG  When compared with ECG of 05-JAN-2024 10:03,  Nonspecific T wave abnormality no longer evident in Lateral leads     CBC with Auto Differential    Collection Time: 01/02/25 12:53 AM   Result Value Ref Range    WBC 8.5 4.6 - 13.2 K/uL    RBC 4.88 4.35 - 5.65 M/uL    Hemoglobin 14.3 13.0 - 16.0 g/dL    Hematocrit 42.7 36.0 - 48.0 %    MCV 87.5 78.0 - 100.0 FL    MCH 29.3 24.0 - 34.0 PG    MCHC 33.5 31.0 - 37.0 g/dL

## 2025-01-02 NOTE — ED NOTES
Bedside shift change report given to VIRGINIA Morejon (oncoming nurse) by VIRGINIA Aparicio (offgoing nurse). Report included the following information ED SBAR.

## 2025-01-02 NOTE — CARE COORDINATION
01/02/25 0925   Service Assessment   Patient Orientation Alert and Oriented   Cognition Alert   History Provided By Patient   Primary Caregiver Self   Accompanied By/Relationship no one at bedside   Support Systems Spouse/Significant Other   Patient's Healthcare Decision Maker is: Patient Declined (Legal Next of Kin Remains as Decision Maker)   PCP Verified by CM Yes   Last Visit to PCP Within last 3 months   Prior Functional Level Independent in ADLs/IADLs   Current Functional Level Independent in ADLs/IADLs   Can patient return to prior living arrangement Yes   Ability to make needs known: Good   Family able to assist with home care needs: Yes   Would you like for me to discuss the discharge plan with any other family members/significant others, and if so, who? Yes  (Eleanor Reeves- spouse)   Financial Resources Medicare   Community Resources None   CM/SW Referral Other (see comment)  (not applicable)   Social/Functional History   Lives With Spouse   Type of Home House   Home Layout One level   Home Access Stairs to enter with rails   Entrance Stairs - Number of Steps 4   Entrance Stairs - Rails Both   Bathroom Shower/Tub Tub/Shower unit   Bathroom Toilet Standard   Bathroom Equipment Grab bars in shower   Bathroom Accessibility Accessible   Home Equipment None   Receives Help From Family   Prior Level of Assist for ADLs Independent   Prior Level of Assist for Homemaking Independent   Homemaking Responsibilities Yes   Ambulation Assistance Independent   Prior Level of Assist for Transfers Independent   Active  Yes   Mode of Transportation Truck   Education   (not applicable)   Occupation Retired   Type of Occupation   (not applicable)   Discharge Planning   Type of Residence House   Living Arrangements Spouse/Significant Other   Current Services Prior To Admission None   Potential Assistance Needed N/A   DME Ordered? Other (comment)  (not applicable)   Potential Assistance Purchasing Medications No   Type

## 2025-01-02 NOTE — PROGRESS NOTES
Presented with upper abd pain- ct shows long segment enteritis/colitis- cipro flagyl given, lactate wnl, posted to med surg,

## 2025-01-02 NOTE — PROGRESS NOTES
New admission from [facility]. Patient has a US abdomen complete, CT Heart W/O contrast with calcium, and lab work from primary physician. Patient wants to know if they can be completed while  admitted.

## 2025-01-02 NOTE — CONSULTS
History & Physical    Date: 1/2/2025    Referring Physician: Hospitalist    Assessment:    Full code    Principal Problem:    SBO (small bowel obstruction) (HCC)  Active Problems:    Essential hypertension    Hyperlipidemia    Colitis  Resolved Problems:    * No resolved hospital problems. *      Plan:   I have discussed the above findings with . Mervin Reeves and personally reviewed his CT scan of the abdomen and pelvis demonstrating severe inflammation of the bowel with dilation.  He states that he currently feels much better than yesterday without any nausea or vomiting.  We can hold on NG tube for now.  I discussed with him that should he develop nausea or vomiting I would recommend NG tube be placed.  Recommend conservative management with n.p.o. today,  IV fluid and antibiotics.  He agrees with this plan.  No acute surgical needs at this time.    History of Present Illness:  Mr. Reeves is a 69 y.o. year old male who presented with severe abdominal pain that was slowly progressing over the last several days.  He states since receiving morphine he feels significantly better without any nausea or vomiting.  He denies any recent sick contacts or similar problems in the past.  He denies any previous abdominal surgery.    History:  Past Medical History:   Diagnosis Date    Colon polyps 2006    Dr Gallego 2006; 2/17 adenoma/hyperplastic and divertics    Degenerative disc disease, lumbar 12/2016    degen disc disease worst L4-S1    ED (erectile dysfunction)     H/O cardiovascular stress test 01/2024    NST low risk, ef 54%, tid 1.22    Hyperlipidemia     calculated 10 yr risk score 14.9% (12/21); declined statin and ca score    Hypertension     IFG (impaired fasting glucose)     2000s    Tinea versicolor 12/14/2021    Transaminasemia 2006    neg US and serologies     Past Surgical History:   Procedure Laterality Date    CHEST SURGERY  12/06    US thyroid negative    COLONOSCOPY      Dr. Gallego (2006) polyps;  (16) polyps and divertics; (20) neg    GI  2019    CT abd/pelvis showed liver and kidney cysts     Family History   Problem Relation Age of Onset    Cancer Father         lung cancer    Cancer Sister         lung cancer     Social History     Socioeconomic History    Marital status:      Spouse name: Not on file    Number of children: Not on file    Years of education: Not on file    Highest education level: Not on file   Occupational History    Occupation: ret [employer]   Tobacco Use    Smoking status: Former     Current packs/day: 0.00     Average packs/day: 0.5 packs/day for 6.0 years (3.0 ttl pk-yrs)     Types: Cigarettes     Start date: 1974     Quit date: 1980     Years since quittin.0     Passive exposure: Never    Smokeless tobacco: Never   Substance and Sexual Activity    Alcohol use: Yes     Alcohol/week: 6.0 standard drinks of alcohol     Types: 6 Cans of beer per week    Drug use: No    Sexual activity: Yes     Partners: Female   Other Topics Concern    Not on file   Social History Narrative    Not on file     Social Determinants of Health     Financial Resource Strain: Low Risk  (2023)    Overall Financial Resource Strain (CARDIA)     Difficulty of Paying Living Expenses: Not hard at all   Food Insecurity: No Food Insecurity (2025)    Hunger Vital Sign     Worried About Running Out of Food in the Last Year: Never true     Ran Out of Food in the Last Year: Never true   Transportation Needs: No Transportation Needs (2025)    PRAPARE - Transportation     Lack of Transportation (Medical): No     Lack of Transportation (Non-Medical): No   Physical Activity: Unknown (2024)    Exercise Vital Sign     Days of Exercise per Week: 5 days     Minutes of Exercise per Session: Not on file   Recent Concern: Physical Activity - Insufficiently Active (2024)    Exercise Vital Sign     Days of Exercise per Week: 5 days     Minutes of Exercise per Session: 20 min

## 2025-01-02 NOTE — ED PROVIDER NOTES
`[facility] EM[facility] DEPT  eMERGENCY dEPARTMENT eNCOUnter      Pt Name: Mervin Reeves  MRN: [id number]  Birthdate 1955 of evaluation: 1/2/2025  Provider:Abisai Austin MD    CHIEF COMPLAINT         HPI    Mervin Reeves is a 69 y.o. male  c/o having epigastric pain x 4 hours that starte 4 hours ago. (+) malaise, no vomiting, (+) loose stools    ROS  Review of Systems    Except as noted above the remainder of the review of systems was reviewed and negative.       PAST MEDICAL HISTORY     Past Medical History:   Diagnosis Date    Colon polyps 2006    Dr Gallego 2006; 2/17 adenoma/hyperplastic and divertics    Degenerative disc disease, lumbar 12/2016    degen disc disease worst L4-S1    ED (erectile dysfunction)     H/O cardiovascular stress test 01/2024    NST low risk, ef 54%, tid 1.22    Hyperlipidemia     calculated 10 yr risk score 14.9% (12/21); declined statin and ca score    Hypertension     IFG (impaired fasting glucose)     2000s    Tinea versicolor 12/14/2021    Transaminasemia 2006    neg US and serologies         SURGICAL HISTORY       Past Surgical History:   Procedure Laterality Date    CHEST SURGERY  12/06    US thyroid negative    COLONOSCOPY      Dr. Gallego (2006) polyps; (2/22/16) polyps and divertics; (7/28/20) neg    GI  01/2019    CT abd/pelvis showed liver and kidney cysts         CURRENTMEDICATIONS       Previous Medications    ATORVASTATIN (LIPITOR) 20 MG TABLET    Take 1 tablet by mouth daily    DILTIAZEM (TIAZAC) 180 MG EXTENDED RELEASE CAPSULE    Take 1 capsule by mouth daily    IBUPROFEN (ADVIL;MOTRIN) 600 MG TABLET    ceived the following from [facility]: Outside name: ibuprofen (MOTRIN) 600 mg tablet    LISINOPRIL (PRINIVIL;ZESTRIL) 20 MG TABLET    Take 1 tablet by mouth daily    MULTIPLE VITAMIN (MULTIVITAMIN ADULT PO)    Take 1 tablet by mouth daily    NAPROXEN (NAPROSYN) 500 MG TABLET    Take 1 tablet by mouth 2 times daily (with meals)       ALLERGIES

## 2025-01-02 NOTE — PLAN OF CARE
Problem: Pain  Goal: Verbalizes/displays adequate comfort level or baseline comfort level  Outcome: Progressing  Flowsheets (Taken 1/2/2025 1446)  Verbalizes/displays adequate comfort level or baseline comfort level:   Encourage patient to monitor pain and request assistance   Assess pain using appropriate pain scale   Administer analgesics based on type and severity of pain and evaluate response   Implement non-pharmacological measures as appropriate and evaluate response  Note: Patient will be able to efficiently rate pain on appropriate pain scale.  Patient will be able to identify pain triggers and ways to manage pain using non pharmacologic interventions     Problem: Physical Therapy - Adult  Goal: By Discharge: Performs mobility at highest level of function for planned discharge setting.  See evaluation for individualized goals.  1/2/2025 1408 by Shanti Lewis, PT  Outcome: Completed     Problem: Gastrointestinal - Adult  Goal: Minimal or absence of nausea and vomiting  Outcome: Progressing  Flowsheets (Taken 1/2/2025 1446)  Minimal or absence of nausea and vomiting:   Administer IV fluids as ordered to ensure adequate hydration   Maintain NPO status until nausea and vomiting are resolved  Goal: Maintains or returns to baseline bowel function  Outcome: Progressing  Flowsheets (Taken 1/2/2025 1446)  Maintains or returns to baseline bowel function:   Assess bowel function   Administer IV fluids as ordered to ensure adequate hydration   Administer ordered medications as needed

## 2025-01-03 VITALS
HEART RATE: 54 BPM | WEIGHT: 160.05 LBS | SYSTOLIC BLOOD PRESSURE: 136 MMHG | OXYGEN SATURATION: 99 % | HEIGHT: 67 IN | BODY MASS INDEX: 25.12 KG/M2 | DIASTOLIC BLOOD PRESSURE: 84 MMHG | RESPIRATION RATE: 18 BRPM | TEMPERATURE: 98.4 F

## 2025-01-03 LAB
ALBUMIN SERPL-MCNC: 3 G/DL (ref 3.4–5)
ALBUMIN/GLOB SERPL: 0.9 (ref 0.8–1.7)
ALP SERPL-CCNC: 46 U/L (ref 45–117)
ALT SERPL-CCNC: 17 U/L (ref 16–61)
ANION GAP SERPL CALC-SCNC: 5 MMOL/L (ref 3–18)
AST SERPL-CCNC: 34 U/L (ref 10–38)
BASOPHILS # BLD: 0 K/UL (ref 0–0.1)
BASOPHILS NFR BLD: 1 % (ref 0–2)
BILIRUB SERPL-MCNC: 0.5 MG/DL (ref 0.2–1)
BUN SERPL-MCNC: 11 MG/DL (ref 7–18)
BUN/CREAT SERPL: 9 (ref 12–20)
CALCIUM SERPL-MCNC: 8.6 MG/DL (ref 8.5–10.1)
CHLORIDE SERPL-SCNC: 109 MMOL/L (ref 100–111)
CO2 SERPL-SCNC: 25 MMOL/L (ref 21–32)
CREAT SERPL-MCNC: 1.27 MG/DL (ref 0.6–1.3)
DIFFERENTIAL METHOD BLD: ABNORMAL
EOSINOPHIL # BLD: 0.3 K/UL (ref 0–0.4)
EOSINOPHIL NFR BLD: 6 % (ref 0–5)
ERYTHROCYTE [DISTWIDTH] IN BLOOD BY AUTOMATED COUNT: 14.1 % (ref 11.6–14.5)
GLOBULIN SER CALC-MCNC: 3.2 G/DL (ref 2–4)
GLUCOSE SERPL-MCNC: 88 MG/DL (ref 74–99)
HCT VFR BLD AUTO: 39.9 % (ref 36–48)
HGB BLD-MCNC: 12.7 G/DL (ref 13–16)
IMM GRANULOCYTES # BLD AUTO: 0 K/UL (ref 0–0.04)
IMM GRANULOCYTES NFR BLD AUTO: 0 % (ref 0–0.5)
LYMPHOCYTES # BLD: 2.5 K/UL (ref 0.9–3.6)
LYMPHOCYTES NFR BLD: 41 % (ref 21–52)
MCH RBC QN AUTO: 28.4 PG (ref 24–34)
MCHC RBC AUTO-ENTMCNC: 31.8 G/DL (ref 31–37)
MCV RBC AUTO: 89.3 FL (ref 78–100)
MONOCYTES # BLD: 0.7 K/UL (ref 0.05–1.2)
MONOCYTES NFR BLD: 12 % (ref 3–10)
NEUTS SEG # BLD: 2.5 K/UL (ref 1.8–8)
NEUTS SEG NFR BLD: 41 % (ref 40–73)
NRBC # BLD: 0 K/UL (ref 0–0.01)
NRBC BLD-RTO: 0 PER 100 WBC
PLATELET # BLD AUTO: 192 K/UL (ref 135–420)
PMV BLD AUTO: 11.4 FL (ref 9.2–11.8)
POTASSIUM SERPL-SCNC: 4.3 MMOL/L (ref 3.5–5.5)
PROT SERPL-MCNC: 6.2 G/DL (ref 6.4–8.2)
RBC # BLD AUTO: 4.47 M/UL (ref 4.35–5.65)
SODIUM SERPL-SCNC: 139 MMOL/L (ref 136–145)
WBC # BLD AUTO: 6 K/UL (ref 4.6–13.2)

## 2025-01-03 PROCEDURE — 99239 HOSP IP/OBS DSCHRG MGMT >30: CPT | Performed by: STUDENT IN AN ORGANIZED HEALTH CARE EDUCATION/TRAINING PROGRAM

## 2025-01-03 PROCEDURE — 85025 COMPLETE CBC W/AUTO DIFF WBC: CPT

## 2025-01-03 PROCEDURE — 80053 COMPREHEN METABOLIC PANEL: CPT

## 2025-01-03 PROCEDURE — 99232 SBSQ HOSP IP/OBS MODERATE 35: CPT

## 2025-01-03 PROCEDURE — 36415 COLL VENOUS BLD VENIPUNCTURE: CPT

## 2025-01-03 PROCEDURE — 2500000003 HC RX 250 WO HCPCS: Performed by: STUDENT IN AN ORGANIZED HEALTH CARE EDUCATION/TRAINING PROGRAM

## 2025-01-03 PROCEDURE — 2580000003 HC RX 258: Performed by: STUDENT IN AN ORGANIZED HEALTH CARE EDUCATION/TRAINING PROGRAM

## 2025-01-03 PROCEDURE — 94761 N-INVAS EAR/PLS OXIMETRY MLT: CPT

## 2025-01-03 PROCEDURE — 6360000002 HC RX W HCPCS: Performed by: STUDENT IN AN ORGANIZED HEALTH CARE EDUCATION/TRAINING PROGRAM

## 2025-01-03 RX ORDER — METRONIDAZOLE 500 MG/1
500 TABLET ORAL 3 TIMES DAILY
Qty: 24 TABLET | Refills: 0 | Status: SHIPPED | OUTPATIENT
Start: 2025-01-03 | End: 2025-01-11

## 2025-01-03 RX ORDER — OXYCODONE HYDROCHLORIDE 5 MG/1
5 TABLET ORAL EVERY 6 HOURS PRN
Status: DISCONTINUED | OUTPATIENT
Start: 2025-01-03 | End: 2025-01-03 | Stop reason: HOSPADM

## 2025-01-03 RX ORDER — OXYCODONE HYDROCHLORIDE 5 MG/1
10 TABLET ORAL EVERY 6 HOURS PRN
Status: DISCONTINUED | OUTPATIENT
Start: 2025-01-03 | End: 2025-01-03 | Stop reason: HOSPADM

## 2025-01-03 RX ORDER — ENOXAPARIN SODIUM 100 MG/ML
40 INJECTION SUBCUTANEOUS DAILY
Status: DISCONTINUED | OUTPATIENT
Start: 2025-01-03 | End: 2025-01-03 | Stop reason: HOSPADM

## 2025-01-03 RX ORDER — LEVOFLOXACIN 5 MG/ML
500 INJECTION, SOLUTION INTRAVENOUS EVERY 24 HOURS
Status: DISCONTINUED | OUTPATIENT
Start: 2025-01-04 | End: 2025-01-03 | Stop reason: HOSPADM

## 2025-01-03 RX ORDER — CIPROFLOXACIN 500 MG/1
500 TABLET, FILM COATED ORAL 2 TIMES DAILY
Qty: 16 TABLET | Refills: 0 | Status: SHIPPED | OUTPATIENT
Start: 2025-01-03 | End: 2025-01-11

## 2025-01-03 RX ADMIN — METRONIDAZOLE 500 MG: 500 INJECTION, SOLUTION INTRAVENOUS at 04:32

## 2025-01-03 RX ADMIN — ENOXAPARIN SODIUM 40 MG: 100 INJECTION SUBCUTANEOUS at 09:43

## 2025-01-03 RX ADMIN — SODIUM CHLORIDE, PRESERVATIVE FREE 10 ML: 5 INJECTION INTRAVENOUS at 09:38

## 2025-01-03 RX ADMIN — SODIUM CHLORIDE 40 MG: 9 INJECTION INTRAMUSCULAR; INTRAVENOUS; SUBCUTANEOUS at 09:35

## 2025-01-03 RX ADMIN — METRONIDAZOLE 500 MG: 500 INJECTION, SOLUTION INTRAVENOUS at 12:51

## 2025-01-03 RX ADMIN — LEVOFLOXACIN 500 MG: 500 INJECTION, SOLUTION INTRAVENOUS at 06:34

## 2025-01-03 ASSESSMENT — PAIN SCALES - GENERAL
PAINLEVEL_OUTOF10: 0

## 2025-01-03 ASSESSMENT — ENCOUNTER SYMPTOMS
NAUSEA: 0
ABDOMINAL PAIN: 0
ABDOMINAL DISTENTION: 0
VOMITING: 0
SHORTNESS OF BREATH: 0

## 2025-01-03 NOTE — PLAN OF CARE
Problem: Pain  Goal: Verbalizes/displays adequate comfort level or baseline comfort level  1/3/2025 1204 by Yarely Lopez RN  Outcome: Progressing  Flowsheets (Taken 1/3/2025 1204)  Verbalizes/displays adequate comfort level or baseline comfort level:   Encourage patient to monitor pain and request assistance   Assess pain using appropriate pain scale   Administer analgesics based on type and severity of pain and evaluate response   Implement non-pharmacological measures as appropriate and evaluate response   Consider cultural and social influences on pain and pain management  Note: Encouraged patient to request fro pain medications when in pain. Pain assessed using the pain scale  Problem: Gastrointestinal - Adult  Goal: Minimal or absence of nausea and vomiting  1/3/2025 1204 by Yarely Lopez RN  Outcome: Progressing  Flowsheets  Taken 1/3/2025 1142  Minimal or absence of nausea and vomiting: Advance diet as tolerated, if ordered  Taken 1/3/2025 0750  Minimal or absence of nausea and vomiting:   Administer IV fluids as ordered to ensure adequate hydration   Maintain NPO status until nausea and vomiting are resolved   Provide nonpharmacologic comfort measures as appropriate  Note: Clear liquid diet ordered.  Goal: Maintains or returns to baseline bowel function  1/3/2025 1204 by Yarely Lopez RN  Outcome: Progressing  Flowsheets  Taken 1/3/2025 1142  Maintains or returns to baseline bowel function:   Assess bowel function   Encourage oral fluids to ensure adequate hydration  Taken 1/3/2025 0750  Maintains or returns to baseline bowel function: Assess bowel function  Note: Encouraged patient to hydrate with oral fluids

## 2025-01-03 NOTE — PROGRESS NOTES
Admit Date: 1/2/2025    Assessment    Mervin Reeves is a 69 y.o. male who was admitted with severe abdominal pain that slowly progressed over several days. CT abdomen and pelvis performed on 1/2/2025 demonstrates severe inflammation of the bowel with dilation. He denies abdominal pain, fevers, or chills.     Patient Active Problem List   Diagnosis    Essential hypertension    IFG (impaired fasting glucose)    Erectile dysfunction    Hyperlipidemia    Colon adenoma    SBO (small bowel obstruction) (HCC)    Enteritis       Plan  -Clear diet, advance as tolerated  -No acute surgical needs at this time. Surgery to sign off. Please reconsult if needed.       Subjective    Overnight events: No acute overnight events. Passing flatus this AM with no bowel movement at this time.     Review of Systems   Constitutional:  Negative for chills and fever.   Respiratory:  Negative for shortness of breath.    Cardiovascular:  Negative for chest pain.   Gastrointestinal:  Negative for abdominal distention, abdominal pain, nausea and vomiting.       Objective    Physical Exam:  BP (!) 145/91   Pulse (!) 47   Temp 98.1 °F (36.7 °C) (Oral)   Resp 20   Ht 1.702 m (5' 7\")   Wt 72.6 kg (160 lb 0.9 oz)   SpO2 98%   BMI 25.07 kg/m²     Intake/Output Summary (Last 24 hours) at 1/3/2025 0800  Last data filed at 1/2/2025 1734  Gross per 24 hour   Intake 173.05 ml   Output --   Net 173.05 ml     Physical Exam  Constitutional:       Appearance: Normal appearance. He is normal weight.   HENT:      Head: Normocephalic and atraumatic.   Eyes:      Extraocular Movements: Extraocular movements intact.      Conjunctiva/sclera: Conjunctivae normal.      Pupils: Pupils are equal, round, and reactive to light.   Cardiovascular:      Rate and Rhythm: Bradycardia present.   Pulmonary:      Effort: Pulmonary effort is normal.   Abdominal:      General: Abdomen is flat. There is no distension.      Palpations: Abdomen is soft.      Tenderness: There

## 2025-01-03 NOTE — PROGRESS NOTES
Pharmacy Note     Levofloxacin 250mg q24h ordered for treatment of intra-abdominal infection. Per [facility] Policy, levofloxacin will be changed to 500mg q24h.     Estimated Creatinine Clearance: Estimated Creatinine Clearance: 51 mL/min (based on SCr of 1.27 mg/dL).  Dialysis Status, PRISCILLA, CKD: -  BMI:  Body mass index is 25.07 kg/m².    Rationale for Adjustment:  [facility] renal dosing policy.    Pharmacy will continue to monitor and adjust dose as necessary.      Please call with any questions.    Thank you,  DONN GONZALES, [facility]

## 2025-01-03 NOTE — PROGRESS NOTES
4 Eyes Skin Assessment     NAME:  Mervin Reeves  YOB: 1955  MEDICAL RECORD NUMBER:  [id number]    The patient is being assessed for  Admission    I agree that at least one RN has performed a thorough Head to Toe Skin Assessment on the patient. ALL assessment sites listed below have been assessed.      Areas assessed by both nurses:    Head, Face, Ears, Shoulders, Back, Chest, Arms, Elbows, Hands, Sacrum. Buttock, Coccyx, Ischium, and Legs. Feet and Heels        Does the Patient have a Wound? No noted wound(s)       Amandeep Prevention initiated by RN: No  Wound Care Orders initiated by RN: No    Pressure Injury (Stage 3,4, Unstageable, DTI, NWPT, and Complex wounds) if present, place Wound referral order by RN under : No    New Ostomies, if present place, Ostomy referral order under : No     Nurse 1 eSignature: Electronically signed by Zara Hart RN on 1/2/25 at 8:51 PM EST    **SHARE this note so that the co-signing nurse can place an eSignature**    Nurse 2 eSignature: {Esignature:[id number]}

## 2025-01-03 NOTE — PROGRESS NOTES
Pharmacist Review and Automatic Dose Adjustment of Prophylactic Enoxaparin    *Review reason for admission/hospital problem list*    The reviewing pharmacist has made an adjustment to the ordered enoxaparin dose or converted to UFH per the approved [facility] protocol and table as identified below.        Mervin Reeves is a 69 y.o. male.     Recent Labs     01/02/25  0053 01/03/25  0406   CREATININE 1.40* 1.27       Estimated Creatinine Clearance: 51 mL/min (based on SCr of 1.27 mg/dL).    Height:   Ht Readings from Last 1 Encounters:   01/02/25 1.702 m (5' 7\")     Weight:  Wt Readings from Last 1 Encounters:   01/02/25 72.6 kg (160 lb 0.9 oz)           Plan: Based upon the patient's weight and renal function, the ordered enoxaparin dose of 30mg  has been changed/converted to 40mg        Thank you,  Bel Morley, [facility]

## 2025-01-03 NOTE — DISCHARGE INSTRUCTIONS
DISCHARGE SUMMARY from Nurse    PATIENT INSTRUCTIONS:    After general anesthesia or intravenous sedation, for 24 hours or while taking prescription Narcotics:  Limit your activities  Do not drive and operate hazardous machinery  Do not make important personal or business decisions  Do  not drink alcoholic beverages  If you have not urinated within 8 hours after discharge, please contact your surgeon on call.    Report the following to your surgeon:  Excessive pain, swelling, redness or odor of or around the surgical area  Temperature over 100.5  Nausea and vomiting lasting longer than 4 hours or if unable to take medications  Any signs of decreased circulation or nerve impairment to extremity: change in color, persistent  numbness, tingling, coldness or increase pain  Any questions    What to do at Home:  Recommended activity: activity as tolerated.    If you experience any of the following symptoms constipation not relieved by medications taken at home, please follow up with primary care or emergency department.    *  Please give a list of your current medications to your Primary Care Provider.    *  Please update this list whenever your medications are discontinued, doses are      changed, or new medications (including over-the-counter products) are added.    *  Please carry medication information at all times in case of emergency situations.    These are general instructions for a healthy lifestyle:    No smoking/ No tobacco products/ Avoid exposure to second hand smoke  Surgeon General's Warning:  Quitting smoking now greatly reduces serious risk to your health.    Obesity, smoking, and sedentary lifestyle greatly increases your risk for illness    A healthy diet, regular physical exercise & weight monitoring are important for maintaining a healthy lifestyle    You may be retaining fluid if you have a history of heart failure or if you experience any of the following symptoms:  Weight gain of 3 pounds or more

## 2025-01-03 NOTE — PLAN OF CARE
Problem: Pain  Goal: Verbalizes/displays adequate comfort level or baseline comfort level  1/3/2025 1753 by Yarely Lopez RN  Outcome: Completed  1/3/2025 1204 by Yarely Lopez RN  Outcome: Progressing  Flowsheets (Taken 1/3/2025 1204)  Verbalizes/displays adequate comfort level or baseline comfort level:   Encourage patient to monitor pain and request assistance   Assess pain using appropriate pain scale   Administer analgesics based on type and severity of pain and evaluate response   Implement non-pharmacological measures as appropriate and evaluate response   Consider cultural and social influences on pain and pain management  Note: Encouraged patient to request fro pain medications when in pain. Pain assessed using the pain scale     Problem: Gastrointestinal - Adult  Goal: Minimal or absence of nausea and vomiting  1/3/2025 1753 by Yarely Lopez RN  Outcome: Completed  1/3/2025 1204 by Yarely Lopez RN  Outcome: Progressing  Flowsheets  Taken 1/3/2025 1142  Minimal or absence of nausea and vomiting: Advance diet as tolerated, if ordered  Taken 1/3/2025 0750  Minimal or absence of nausea and vomiting:   Administer IV fluids as ordered to ensure adequate hydration   Maintain NPO status until nausea and vomiting are resolved   Provide nonpharmacologic comfort measures as appropriate  Note: Clear liquid diet ordered.  Goal: Maintains or returns to baseline bowel function  1/3/2025 1753 by Yarely Lopez RN  Outcome: Completed  1/3/2025 1204 by Yarely Lopez RN  Outcome: Progressing  Flowsheets  Taken 1/3/2025 1142  Maintains or returns to baseline bowel function:   Assess bowel function   Encourage oral fluids to ensure adequate hydration  Taken 1/3/2025 0750  Maintains or returns to baseline bowel function: Assess bowel function  Note: Encouraged patient to hydrate with oral fluids

## 2025-01-03 NOTE — PROGRESS NOTES
4 Eyes Skin Assessment     NAME:  Mervin Reeves  YOB: 1955  MEDICAL RECORD NUMBER:  [id number]    The patient is being assessed for  Shift Handoff    I agree that at least one RN has performed a thorough Head to Toe Skin Assessment on the patient. ALL assessment sites listed below have been assessed.      Areas assessed by both nurses:    Head, Face, Ears, Shoulders, Back, Chest, Arms, Elbows, Hands, Sacrum. Buttock, Coccyx, Ischium, Legs. Feet and Heels, and Under Medical Devices         Does the Patient have a Wound? No noted wound(s)       Amandeep Prevention initiated by RN: Yes  Wound Care Orders initiated by RN: No    Pressure Injury (Stage 3,4, Unstageable, DTI, NWPT, and Complex wounds) if present, place Wound referral order by RN under : No    New Ostomies, if present place, Ostomy referral order under : No     Nurse 1 eSignature: Electronically signed by Cecilia Belle RN on 1/3/25 at 7:20 AM EST    **SHARE this note so that the co-signing nurse can place an eSignature**    Nurse 2 eSignature: Electronically signed by Yarely Lopez RN on 1/3/25 at 3:37 PM EST

## 2025-01-03 NOTE — PROGRESS NOTES
Advance Care Planning   Healthcare Decision Maker:    Primary Decision Maker: Eleanor Reeves - Spouse - [phone]    Today we documented Decision Maker(s) consistent with Legal Next of Kin hierarchy.       Spiritual Health History and Assessment/Progress Note  [facility]    (P) Spiritual/Emotional Needs,  ,  ,      Name: Mervin Reeves MRN: [id number]    Age: 69 y.o.     Sex: male   Language: English   Caodaism: Quaker   SBO (small bowel obstruction) ([facility])     Date: 1/3/2025            Total Time Calculated: (P) 7 min              Spiritual Assessment began in [address] MEDICAL        Referral/Consult From: (P) Rounding   Encounter Overview/Reason: (P) Spiritual/Emotional Needs  Service Provided For: (P) Patient and family together    [place], Belief, Meaning:   Patient has beliefs or practices that help with coping during difficult times  Family/Friends have beliefs or practices that help with coping during difficult times      Importance and Influence:  Patient has spiritual/personal beliefs that influence decisions regarding their health  Family/Friends have spiritual/personal beliefs that influence decisions regarding the patient's health    Community:  Patient feels well-supported. Support system includes: Spouse/Partner  Family/Friends feel well-supported. Support system includes: Spouse/Partner    Assessment and Plan of Care:     Patient Interventions include: Facilitated expression of thoughts and feelings  Family/Friends Interventions include: Facilitated expression of thoughts and feelings    Patient Plan of Care: No spiritual needs identified for follow-up  Family/Friends Plan of Care: No spiritual needs identified for follow-up    Electronically signed by COOPER Kwon on 1/3/2025 at 11:43 AM

## 2025-01-03 NOTE — PLAN OF CARE
Problem: Pain  Goal: Verbalizes/displays adequate comfort level or baseline comfort level  1/3/2025 0217 by Cecilia Belle RN  Outcome: Progressing  Note: Will give PRN analgesic per MD order. Will reassess pain.     Problem: Gastrointestinal - Adult  Goal: Minimal or absence of nausea and vomiting  1/3/2025 0217 by Cecilia Belle RN  Outcome: Progressing  Note: Will follow NPO orders for pt this shift.     Problem: Gastrointestinal - Adult  Goal: Maintains or returns to baseline bowel function  1/3/2025 0217 by Cecilia Belle RN  Outcome: Progressing  Note: Will assess for N/V, ABD pain, and bowel function this shift.

## 2025-01-05 NOTE — DISCHARGE SUMMARY
Discharge Summary    Patient: Mervin Reeves MRN: [id number]  CSN: [id number]    YOB: 1955  Age: 69 y.o.  Sex: male    DOA: 1/2/2025 LOS:  LOS: 1 day        Disposition: Home    Discharge Date: 1/3/2025    Admission Diagnosis: SBO (small bowel obstruction) (HCC) [K56.609]  Colitis [K52.9]    Discharge Diagnosis:    #Small bowel enteritis  #Partial SBO  #Hypertension  #Hyperlipidemia  #Hepatic steatosis    Discharge Condition: Stable      PHYSICAL EXAM  Visit Vitals  /84   Pulse 54   Temp 98.4 °F (36.9 °C) (Oral)   Resp 18   Ht 1.702 m (5' 7\")   Wt 72.6 kg (160 lb 0.9 oz)   SpO2 99%   BMI 25.07 kg/m²       General: Alert, cooperative, no acute distress    HEENT: PERRLA, EOMI. Anicteric sclerae.  Lungs:  CTA Bilaterally. No Wheezing/Rales.  Heart:             Regular rate and Rhythm.  Abdomen: Soft, Non distended, Non tender. + Bowel sounds.  Extremities: No edema.  Psych:   Good insight. Not anxious or agitated.  Neurologic:  AA, oriented X 3. Moves all ext                                 Hospital Course:   Mervin is a 69 y.o.   male with pmh of htn and hyperlipidemia presents with epigastric pain. Pain started yesterday morning. It has been constant. He rates it a 10/10. He has slight nausea. He denied diarrhea, constipation, vomiting, headache. His PCP advised that he takes statin for hyperlipidemia, but patient refuses. He is waiting for calcium score scan before starting statin. He is independent with all ADLs. Lives with his wife. Smoked cigarettes in the 80s. He drinks a 6 pack of beer a week.      Patient was found to have small bowel colitis. Patient was started on IV abx. Surgery was consulted. Patient was initially NPO and progressed to clear liquid diet. Abdominal pain improved. Patient is hemodynamically stable for discharge home. Advised patient to take abx and to continue liquid diet for 3-5 more days.        Procedures: None     Consults: general surgery    Imaging

## 2025-01-06 ENCOUNTER — ENROLLMENT (OUTPATIENT)
Facility: CLINIC | Age: 70
End: 2025-01-06

## 2025-01-06 ENCOUNTER — CARE COORDINATION (OUTPATIENT)
Facility: CLINIC | Age: 70
End: 2025-01-06

## 2025-01-06 DIAGNOSIS — K56.609 SBO (SMALL BOWEL OBSTRUCTION) (HCC): Primary | ICD-10-CM

## 2025-01-06 PROCEDURE — 1111F DSCHRG MED/CURRENT MED MERGE: CPT | Performed by: INTERNAL MEDICINE

## 2025-01-06 ASSESSMENT — ENCOUNTER SYMPTOMS
NAUSEA: 1
ABDOMINAL PAIN: 1
VOMITING: 1

## 2025-01-06 NOTE — CARE COORDINATION
Care Transitions Note    Initial Call - Call within 2 business days of discharge: Yes    Patient Current Location:  Home: [address] [place] [id number]    Care Transition Nurse contacted the patient by telephone to perform post hospital discharge assessment, verified name and  as identifiers. Provided introduction to self, and explanation of the Care Transition Nurse role.     Patient: Mervin Reeves    Patient : 1955   MRN: [id number]    Reason for Admission: SBO  Discharge Date: 1/3/25  RURS: Readmission Risk Score: 6.3      Last Discharge Facility       Date Complaint Diagnosis Description Type Department Provider    25 Abdominal Pain Small bowel obstruction (HCC) ED to Hosp-Admission (Discharged) (ADMITTED) [id number] Merline Franklin DO; [place], ...            Was this an external facility discharge? No    Additional needs identified to be addressed with provider   No needs identified             Method of communication with provider: chart routing.    Patients top risk factors for readmission: medical condition-SBO    Interventions to address risk factors:   Education: CTN educated patient on SBO red flags    Reviewed/educated patient on s/s to monitor and report:   Abdominal pain or cramps that come and go  Loss of/decreased appetite  Constipation  Inability to pass gas  Nausea/Vomiting  Inability to have a bowel movement or pass gas  Swelling of the abdomen/Bloating     Care Summary Note: Patient reported feeling okay this morning. Denied N/V, constipation, or abdominal pain. Patient following a liquid diet. Patient aware he may advance his diet as tolerated. Last BM was yesterday; normal stool. Denied constipation or diarrhea.     Care Transition Nurse reviewed discharge instructions and red flags with patient. The patient was given an opportunity to ask questions; all questions answered at this time.. The patient verbalized understanding.   Were discharge instructions available to

## 2025-01-10 NOTE — PROGRESS NOTES
Physician Progress Note      PATIENT:               FARZANA MARTIN  CSN #:                  [id number]  :                       1955  ADMIT DATE:       2025 12:24 AM  DISCH DATE:        1/3/2025 6:20 PM  RESPONDING  PROVIDER #:        Merline Franklin DO          QUERY TEXT:    Pt admitted with epigastric pain and has small bowel enteritis documented in   DS . If possible, please document the type of colitis in the medical   record.    The medical record reflects the following:  Risk Factors: SBO, 69-year-old    Clinical Indicators:   DS  \"Small bowel enteritis, Patient was found to   have small bowel colitis. Patient was started on IV abx. Surgery was   consulted. Patient was initially NPO and progressed to clear liquid diet.   Abdominal pain improved. Patient is hemodynamically stable for discharge home.   Advised patient to take abx and to continue liquid diet for 3-5 more days\".    GS Consult  \"CT abdomen and pelvis performed on 2025 demonstrates   severe inflammation of the bowel with dilation\".    Treatment: IV Ciprofloxacin, General Surgery consult, CT Abd    Thank you  MATT Larson CDS  Options provided:  -- Bacterial Colitis  -- Other - I will add my own diagnosis  -- Disagree - Not applicable / Not valid  -- Disagree - Clinically unable to determine / Unknown  -- Refer to Clinical Documentation Reviewer    PROVIDER RESPONSE TEXT:    This patient has bacterial colitis.    Query created by: Andres Ambriz on 1/10/2025 1:26 AM      Electronically signed by:  Merline Franklin DO 1/10/2025 1:53 PM

## 2025-01-13 ENCOUNTER — OFFICE VISIT (OUTPATIENT)
Facility: CLINIC | Age: 70
End: 2025-01-13

## 2025-01-13 ENCOUNTER — CARE COORDINATION (OUTPATIENT)
Facility: CLINIC | Age: 70
End: 2025-01-13

## 2025-01-13 VITALS
HEIGHT: 67 IN | WEIGHT: 158 LBS | BODY MASS INDEX: 24.8 KG/M2 | RESPIRATION RATE: 16 BRPM | OXYGEN SATURATION: 100 % | DIASTOLIC BLOOD PRESSURE: 72 MMHG | TEMPERATURE: 99.4 F | SYSTOLIC BLOOD PRESSURE: 128 MMHG | HEART RATE: 51 BPM

## 2025-01-13 DIAGNOSIS — K56.600 PARTIAL SMALL BOWEL OBSTRUCTION (HCC): Primary | ICD-10-CM

## 2025-01-13 DIAGNOSIS — R74.01 TRANSAMINASEMIA: ICD-10-CM

## 2025-01-13 DIAGNOSIS — K52.9 ENTERITIS OF SMALL BOWEL: ICD-10-CM

## 2025-01-13 ASSESSMENT — PATIENT HEALTH QUESTIONNAIRE - PHQ9
1. LITTLE INTEREST OR PLEASURE IN DOING THINGS: NOT AT ALL
SUM OF ALL RESPONSES TO PHQ QUESTIONS 1-9: 0
SUM OF ALL RESPONSES TO PHQ QUESTIONS 1-9: 0
SUM OF ALL RESPONSES TO PHQ9 QUESTIONS 1 & 2: 0
SUM OF ALL RESPONSES TO PHQ QUESTIONS 1-9: 0
SUM OF ALL RESPONSES TO PHQ QUESTIONS 1-9: 0
2. FEELING DOWN, DEPRESSED OR HOPELESS: NOT AT ALL

## 2025-01-13 NOTE — PROGRESS NOTES
Patient being seen today for transitional care management    Patient was admitted to [facility] from 1/2-3/25               Initial interactive contact was done by nurse navigator     I thoroughly reviewed the discharge summary, notes, consults, labs and imaging studies in the electronic record.  Pertinent details are summarized below and the record has been updated to reflect recent events    He presented with abd pain which did not improve.  CT showed sb enteritis and partial sbo.  Seen by Dr Phillips, managed conservatively and sent home on abx.  Since dc, he's been doing fine.      Past Medical History:   Diagnosis Date    Colon polyps 2006    Dr Gallego 2006; 2/17 adenoma/hyperplastic and divertics    Degenerative disc disease, lumbar 12/2016    degen disc disease worst L4-S1    ED (erectile dysfunction)     H/O cardiovascular stress test 01/2024    NST low risk, ef 54%, tid 1.22    Hyperlipidemia     calculated 10 yr risk score 14.9% (12/21); declined statin and ca score    Hypertension     IFG (impaired fasting glucose)     2000s    Metabolic dysfunction-associated steatotic liver disease (MASLD) 2006    tranasaminasemia 2006 w neg US and serologies (2006); hepatic steatosis on CT (12/24); Fib-4 2.35 (1/25); Fib-4 2.35 (12/24)    SBO (small bowel obstruction) (HCC) 01/2025    Dr Phillips, conservative mgmt; sb enteritis    Tinea versicolor 12/14/2021     Past Surgical History:   Procedure Laterality Date    CHEST SURGERY  12/06    US thyroid negative    COLONOSCOPY      Dr. Gallego (2006) polyps; (2/22/16) polyps and divertics; (7/28/20) neg    GI  01/2019    CT abd/pelvis showed liver and kidney cysts     Current Outpatient Medications   Medication Sig    dilTIAZem (TIAZAC) 180 MG extended release capsule Take 1 capsule by mouth daily    Multiple Vitamin (MULTIVITAMIN ADULT PO) Take 1 tablet by mouth daily     No current facility-administered medications for this visit.     Vitals:    01/13/25 0759   BP: 128/72

## 2025-01-13 NOTE — PROGRESS NOTES
Mervin Reeves presents today for   Chief Complaint   Patient presents with    Follow-Up from Hospital     01/02-03 [facility]; small bowel obstruction       \"Have you been to the ER, urgent care clinic since your last visit?  Hospitalized since your last visit?\"    YES - When: approximately 1  weeks ago.  Where and Why: [facility], small bowel obstruction.    “Have you seen or consulted any other health care providers outside of [facility] since your last visit?”    NO

## 2025-01-13 NOTE — CARE COORDINATION
Care Transitions Note    Follow Up Call     Patient Current Location:  Home: [address]  [facility] [facility] [address]    [facility] Care Coordinator contacted the patient by telephone. Verified name and  as identifiers.    Additional needs identified to be addressed with provider   No needs identified                 Method of communication with provider: none.    Care Summary Note:   Spoke with patient.  Patient states he is doping good.  Patient followed up with pcp this morning.  Patient has no questions or concerns.      Advance Care Planning:   Does patient have an Advance Directive: deferred at this time, will discuss on future follow up. .    Medication Review:  No changes since last call.     Assessments:  See pcp note 25    Follow Up Appointment:   Reviewed upcoming appointment(s). and SHOBHA appointment attended as scheduled   Future Appointments         Provider Specialty Dept Phone    2025 10:00 AM (Arrive by 9:30 AM) [facility] CT RM 1 Radiology [phone]    2025 1:00 PM (Arrive by 12:45 PM) [facility] US RM 2 Radiology [phone]    2025 8:00 AM [facility] LAB VISIT Internal Medicine [phone]    2025 8:40 AM Jordan Mena MD Internal Medicine [phone]            N Care Coordinator provided contact information.  Plan for follow-up call in 6-10 days based on severity of symptoms and risk factors.  Plan for next call:  ACP, assess fore any needs      Marian Beltran LPN

## 2025-01-14 ENCOUNTER — HOSPITAL ENCOUNTER (OUTPATIENT)
Facility: HOSPITAL | Age: 70
Discharge: HOME | End: 2025-01-17
Attending: INTERNAL MEDICINE
Payer: MEDICARE

## 2025-01-14 DIAGNOSIS — E78.5 HYPERLIPIDEMIA, UNSPECIFIED HYPERLIPIDEMIA TYPE: ICD-10-CM

## 2025-01-14 DIAGNOSIS — R74.01 TRANSAMINASEMIA: ICD-10-CM

## 2025-01-14 PROCEDURE — 75571 CT HRT W/O DYE W/CA TEST: CPT

## 2025-01-14 PROCEDURE — 76981 USE PARENCHYMA: CPT

## 2025-01-15 ENCOUNTER — TELEPHONE (OUTPATIENT)
Facility: CLINIC | Age: 70
End: 2025-01-15

## 2025-01-15 NOTE — TELEPHONE ENCOUNTER
----- Message from Dr. Jordan Mena MD sent at 1/13/2025  8:15 AM EST -----  Pls cancel the US I ordered in Dec  I sent in new order for organ elastography instead

## 2025-01-20 ENCOUNTER — TELEPHONE (OUTPATIENT)
Facility: CLINIC | Age: 70
End: 2025-01-20

## 2025-01-20 ENCOUNTER — CARE COORDINATION (OUTPATIENT)
Facility: CLINIC | Age: 70
End: 2025-01-20

## 2025-01-20 DIAGNOSIS — K75.81 METABOLIC DYSFUNCTION-ASSOCIATED STEATOHEPATITIS (MASH): Primary | ICD-10-CM

## 2025-01-20 NOTE — TELEPHONE ENCOUNTER
Pls call    US showed F2-3 fibrosis  Rec GI consult for opinion     Did he get labs done?  If not, pls print out the labs ordered 12/19/24 and mail slip to pt to get done prior to when he sees GI

## 2025-01-20 NOTE — CARE COORDINATION
Care Transitions Note    Follow Up Call     Patient Current Location:  Home: [address]  [facility] [address]    N Care Coordinator contacted the patient by telephone. Verified name and  as identifiers.    Additional needs identified to be addressed with provider   No needs identified                 Method of communication with provider: none.    Care Summary Note:   Spoke with patient.  Patient states he is doing fine.  Patient has no questions or concerns.      Advance Care Planning:   Does patient have an Advance Directive: not on file; education provided - patient would like paperwork mailed to him .    Medication Review:  No changes since last call.     Assessments:  No changes since last call    Follow Up Appointment:   Reviewed upcoming appointment(s).  Future Appointments         Provider Specialty Dept Phone    2025 8:00 AM [facility] LAB VISIT Internal Medicine [phone]    2025 8:40 AM Jordan Mena MD Internal Medicine [phone]            [facility] Care Coordinator provided contact information.  Plan for follow-up call in 6-10 days based on severity of symptoms and risk factors.  Plan for next call:  assess for any needs      Marian Beltran LPN

## 2025-01-26 ENCOUNTER — TELEPHONE (OUTPATIENT)
Facility: CLINIC | Age: 70
End: 2025-01-26

## 2025-01-26 PROBLEM — R93.1 AGATSTON CAC SCORE 200-399: Status: ACTIVE | Noted: 2025-01-01

## 2025-01-28 ENCOUNTER — CARE COORDINATION (OUTPATIENT)
Facility: CLINIC | Age: 70
End: 2025-01-28

## 2025-01-28 NOTE — CARE COORDINATION
Care Transitions Note    Follow Up Call     Patient Current Location:  Home: [address]  [facility] [address]    Care Transition Nurse contacted the patient by telephone. Verified name and  as identifiers.    Additional needs identified to be addressed with provider   No needs identified         Method of communication with provider: none.    Care Summary Note: Patient reported he is doing fine. Denied constipation, abdominal pain or cramping, or N/V. Patient reported he is having 2 bowel movements a day. Denied blood in stool. Denied any needs, questions or concerns at this time.    Plan of care updates since last contact:  none       Advance Care Planning:   Does patient have an Advance Directive: reviewed during previous call, see note. .    Medication Review:  No changes since last call.     Remote Patient Monitoring:  Offered patient enrollment in the Remote Patient Monitoring (RPM) program for in-home monitoring: Patient is not eligible for RPM program because: patient does not have qualifying diagnosis.    Assessments:  No changes since last call    Follow Up Appointment:   SHOBHA appointment attended as scheduled   Future Appointments         Provider Specialty Dept Phone    2025 11:30 AM [facility] LAB VISIT Internal Medicine [phone]    2025 8:00 AM [facility] LAB VISIT Internal Medicine [phone]    2025 8:40 AM Jordan Mena MD Internal Medicine [phone]            Care Transition Nurse provided contact information.  Plan for follow-up call in 6-10 days based on severity of symptoms and risk factors.  Plan for next call:  assess for needs, questions or concerns      Joleen Motta RN

## 2025-01-29 ENCOUNTER — HOSPITAL ENCOUNTER (OUTPATIENT)
Facility: HOSPITAL | Age: 70
Setting detail: SPECIMEN
Discharge: HOME | End: 2025-02-01
Payer: MEDICARE

## 2025-01-29 DIAGNOSIS — R74.01 TRANSAMINASEMIA: ICD-10-CM

## 2025-01-29 LAB
FERRITIN SERPL-MCNC: 381 NG/ML (ref 8–388)
IRON SATN MFR SERPL: 33 % (ref 20–50)
IRON SERPL-MCNC: 101 UG/DL (ref 50–175)
TIBC SERPL-MCNC: 303 UG/DL (ref 250–450)

## 2025-01-29 PROCEDURE — 87340 HEPATITIS B SURFACE AG IA: CPT

## 2025-01-29 PROCEDURE — 36415 COLL VENOUS BLD VENIPUNCTURE: CPT

## 2025-01-29 PROCEDURE — 82390 ASSAY OF CERULOPLASMIN: CPT

## 2025-01-29 PROCEDURE — 83540 ASSAY OF IRON: CPT

## 2025-01-29 PROCEDURE — 82728 ASSAY OF FERRITIN: CPT

## 2025-01-29 PROCEDURE — 83550 IRON BINDING TEST: CPT

## 2025-01-29 PROCEDURE — 86038 ANTINUCLEAR ANTIBODIES: CPT

## 2025-01-29 PROCEDURE — 86381 MITOCHONDRIAL ANTIBODY EACH: CPT

## 2025-01-29 PROCEDURE — 86803 HEPATITIS C AB TEST: CPT

## 2025-01-30 LAB
HBV SURFACE AG SER QL: <0.1 INDEX
HBV SURFACE AG SER QL: NEGATIVE
HCV AB SER IA-ACNC: 0.25 INDEX
HCV AB SERPL QL IA: NEGATIVE
HEPATITIS C COMMENT: NORMAL

## 2025-01-31 LAB — CERULOPLASMIN SERPL-MCNC: 24 MG/DL (ref 16–31)

## 2025-02-02 LAB — MITOCHONDRIA M2 IGG SER-ACNC: <20 UNITS (ref 0–20)

## 2025-02-04 LAB — ANA TITR SER IF: NEGATIVE

## 2025-02-05 ENCOUNTER — CARE COORDINATION (OUTPATIENT)
Facility: CLINIC | Age: 70
End: 2025-02-05

## 2025-02-05 NOTE — CARE COORDINATION
Care Transitions Note    Final Call     Patient Current Location:  Home: [address]  [place] [facility] [address]    [facility] Care Coordinator contacted the patient by telephone. Verified name and  as identifiers.    Patient graduated from the Care Transitions program on 25.  Patient/family has the ability to self manage at this time..      Advance Care Planning:   Does patient have an Advance Directive: reviewed during previous call, see note. .    Handoff:   Patient was not referred to the ACM team due to no additional needs identified.       Care Summary Note:   Spoke with patient's spouse.  Spouse states patient went to the store.Spouse states patient is doing fine.  Spouse states they have no questions or concerns.  Spouse will have patient to return call if he has any questions or concerns.    Assessments:  Care Transitions Subsequent and Final Call    Subsequent and Final Calls  Do you have any ongoing symptoms?: No  Have your medications changed?: No  Do you have any questions related to your medications?: No  Do you currently have any active services?: No  Do you have any needs or concerns that I can assist you with?: No  Identified Barriers: None  Care Transitions Interventions  No Identified Needs  Other Interventions:            No changes since last call    Upcoming Appointments:    Future Appointments         Provider Specialty Dept Phone    2025 10:00 AM (Arrive by 9:30 AM) [facility] CT RM 1 Radiology [phone]    2025 8:00 AM [facility] LAB VISIT Internal Medicine [phone]    2025 8:40 AM Jordan Mena MD Internal Medicine [phone]            Patient has agreed to contact primary care provider and/or specialist for any further questions, concerns, or needs.    Marian Beltran LPN

## 2025-02-06 ENCOUNTER — HOSPITAL ENCOUNTER (OUTPATIENT)
Facility: HOSPITAL | Age: 70
Discharge: HOME | End: 2025-02-09
Attending: INTERNAL MEDICINE
Payer: MEDICARE

## 2025-02-06 DIAGNOSIS — J61 ASBESTOSIS (HCC): ICD-10-CM

## 2025-02-06 PROCEDURE — 71250 CT THORAX DX C-: CPT

## 2025-02-21 ENCOUNTER — TELEPHONE (OUTPATIENT)
Facility: CLINIC | Age: 70
End: 2025-02-21

## 2025-02-21 NOTE — TELEPHONE ENCOUNTER
TeamVisibility message sent to patient with result note from Dr. Mena.    No further action required.

## 2025-03-11 ENCOUNTER — TELEPHONE (OUTPATIENT)
Facility: CLINIC | Age: 70
End: 2025-03-11

## 2025-03-11 DIAGNOSIS — E78.5 HYPERLIPIDEMIA, UNSPECIFIED HYPERLIPIDEMIA TYPE: ICD-10-CM

## 2025-03-11 NOTE — TELEPHONE ENCOUNTER
The patient needs a call back to discuss if he should restart taking the medication atorvastatin. This medication was stopped when he was admitted into [facility] and never restarted.    He called the [facility] pharmacy and was told he has no refills onfile.  Please advise.

## 2025-03-12 RX ORDER — ATORVASTATIN CALCIUM 10 MG/1
10 TABLET, FILM COATED ORAL DAILY
Qty: 90 TABLET | Refills: 3 | Status: SHIPPED | OUTPATIENT
Start: 2025-03-12

## 2025-03-12 NOTE — TELEPHONE ENCOUNTER
Yes, he's on it indefinitely  Script sent to DOD       Diagnosis Orders   1. Hyperlipidemia, unspecified hyperlipidemia type  atorvastatin (LIPITOR) 10 MG tablet

## 2025-03-13 ENCOUNTER — TRANSCRIBE ORDERS (OUTPATIENT)
Facility: HOSPITAL | Age: 70
End: 2025-03-13

## 2025-03-13 DIAGNOSIS — R93.3 ABNORMAL FINDINGS ON RADIOLOGICAL EXAMINATION OF GASTROINTESTINAL TRACT: ICD-10-CM

## 2025-03-13 DIAGNOSIS — Z86.0100 PERSONAL HISTORY OF COLON POLYPS, UNSPECIFIED: ICD-10-CM

## 2025-03-13 DIAGNOSIS — E66.3 OVER WEIGHT: ICD-10-CM

## 2025-03-13 DIAGNOSIS — K76.0 HEPATIC STEATOSIS DETERMINED BY BIOPSY OF LIVER: Primary | ICD-10-CM

## 2025-03-25 DIAGNOSIS — I10 ESSENTIAL HYPERTENSION: Primary | ICD-10-CM

## 2025-03-25 NOTE — TELEPHONE ENCOUNTER
Refill request via fax     Medication: lisinopril (PRINIVIL;ZESTRIL) 20 MG tablet   Quantity: 90  Pharmacy: [place] [address] - P [phone] - F [phone] [[id number]]   Last Fill: 06/18/2024    PCP: Jordan Mena MD    LAST OFFICE VISIT: 01/13/2025      Future Appointments   Date Time Provider Department Center   12/16/2025  8:00 AM [facility] LAB VISIT [facility] DEP   12/23/2025  8:40 AM Jordan Mena MD [facility] DEP

## 2025-03-25 NOTE — TELEPHONE ENCOUNTER
Called and spoke to patient's spouse who states patient was taken off medication at hospital due to antibiotics that he was given upon discharge. Spouse states patient has been taking it since completing the antibiotics.    Pended, please review and advise.

## 2025-03-25 NOTE — TELEPHONE ENCOUNTER
Medication was discontinued: Merline Franklin DO on 1/3/2025 16:36; Stop Taking at Discharge     Please review and advise.

## 2025-03-27 RX ORDER — LISINOPRIL 20 MG/1
20 TABLET ORAL DAILY
Qty: 90 TABLET | Refills: 3 | Status: SHIPPED | OUTPATIENT
Start: 2025-03-27

## 2025-04-04 ENCOUNTER — HOSPITAL ENCOUNTER (OUTPATIENT)
Facility: HOSPITAL | Age: 70
Discharge: HOME | End: 2025-04-04
Attending: STUDENT IN AN ORGANIZED HEALTH CARE EDUCATION/TRAINING PROGRAM
Payer: MEDICARE

## 2025-04-04 DIAGNOSIS — K76.0 HEPATIC STEATOSIS DETERMINED BY BIOPSY OF LIVER: ICD-10-CM

## 2025-04-04 DIAGNOSIS — R93.3 ABNORMAL FINDINGS ON RADIOLOGICAL EXAMINATION OF GASTROINTESTINAL TRACT: ICD-10-CM

## 2025-04-04 DIAGNOSIS — Z86.0100 PERSONAL HISTORY OF COLON POLYPS, UNSPECIFIED: ICD-10-CM

## 2025-04-04 DIAGNOSIS — E66.3 OVER WEIGHT: ICD-10-CM

## 2025-04-04 PROCEDURE — 93975 VASCULAR STUDY: CPT

## 2025-04-05 LAB
VAS AORTA DIST AP: 1.79 CM
VAS AORTA DIST PSV: 90.8 CM/S
VAS AORTA DIST TR: 1.76 CM
VAS AORTA MID AP: 1.76 CM
VAS AORTA MID PSV: 75.7 CM/S
VAS AORTA MID TRANS: 1.73 CM
VAS AORTA PROX AP: 1.52 CM
VAS AORTA PROX PSV: 90.6 CM/S
VAS AORTA PROX TR: 1.7 CM
VAS CELIAC ARTERY EXPIRATION EDV: 17.9 CM/S
VAS CELIAC ARTERY EXPIRATION PSV: 111.2 CM/S
VAS CELIAC ARTERY INSPIRATION EDV: 21.6 CM/S
VAS CELIAC ARTERY INSPIRATION PSV: 96.3 CM/S
VAS CELIAC EDV: 16.6 CM/S
VAS CELIAC PSV: 117.3 CM/S
VAS COMMON HEPATIC EDV: 23 CM/S
VAS COMMON HEPATIC PSV: 105.2 CM/S
VAS DIST SMA EDV: 15.4 CM/S
VAS DIST SMA PSV: 180.5 CM/S
VAS IMA EDV: 8.9 CM/S
VAS IMA PSV: 143.1 CM/S
VAS MID SMA EDV: 18.1 CM/S
VAS MID SMA PSV: 225.1 CM/S
VAS ORIGIN SMA EDV: 13.6 CM/S
VAS ORIGIN SMA PSV: 156.9 CM/S
VAS PROX SMA EDV: 22.3 CM/S
VAS PROX SMA PSV: 193.8 CM/S
VAS SPLENIC EDV: 17 CM/S
VAS SPLENIC PSV: 66.9 CM/S

## 2025-04-05 PROCEDURE — 93975 VASCULAR STUDY: CPT | Performed by: INTERNAL MEDICINE

## 2025-05-07 ENCOUNTER — TELEPHONE (OUTPATIENT)
Facility: CLINIC | Age: 70
End: 2025-05-07

## 2025-05-07 DIAGNOSIS — R93.1 AGATSTON CAC SCORE 200-399: ICD-10-CM

## 2025-05-07 DIAGNOSIS — E78.5 HYPERLIPIDEMIA, UNSPECIFIED HYPERLIPIDEMIA TYPE: Primary | ICD-10-CM

## 2025-05-07 RX ORDER — ROSUVASTATIN CALCIUM 10 MG/1
10 TABLET, COATED ORAL DAILY
Qty: 90 TABLET | Refills: 1 | Status: SHIPPED | OUTPATIENT
Start: 2025-05-07

## 2025-05-07 NOTE — TELEPHONE ENCOUNTER
Called patient and left message informing him that Dr. Mena had sent in a new medication and to call the office if he has any side effects with the new medication.    No further action required.

## 2025-05-07 NOTE — TELEPHONE ENCOUNTER
Stay of lipitor x1 mo  Would try crestor in 3-4 weeks - sent     Diagnosis Orders   1. Hyperlipidemia, unspecified hyperlipidemia type  rosuvastatin (CRESTOR) 10 MG tablet      2. Agatston CAC score 200-399  rosuvastatin (CRESTOR) 10 MG tablet

## 2025-05-07 NOTE — TELEPHONE ENCOUNTER
The patient states he stopped taking the atorvastatin because he started experiencing really bad hand cramps.  Please advise.

## 2025-05-20 ENCOUNTER — HOSPITAL ENCOUNTER (EMERGENCY)
Age: 70
Discharge: HOME | End: 2025-05-20
Attending: EMERGENCY MEDICINE
Payer: MEDICARE

## 2025-05-20 ENCOUNTER — APPOINTMENT (OUTPATIENT)
Age: 70
End: 2025-05-20
Payer: MEDICARE

## 2025-05-20 VITALS
HEART RATE: 55 BPM | BODY MASS INDEX: 25.11 KG/M2 | SYSTOLIC BLOOD PRESSURE: 157 MMHG | HEIGHT: 67 IN | WEIGHT: 160 LBS | OXYGEN SATURATION: 100 % | TEMPERATURE: 98 F | DIASTOLIC BLOOD PRESSURE: 78 MMHG | RESPIRATION RATE: 16 BRPM

## 2025-05-20 DIAGNOSIS — M79.675 GREAT TOE PAIN, LEFT: Primary | ICD-10-CM

## 2025-05-20 PROCEDURE — 99283 EMERGENCY DEPT VISIT LOW MDM: CPT

## 2025-05-20 PROCEDURE — 6370000000 HC RX 637 (ALT 250 FOR IP): Performed by: EMERGENCY MEDICINE

## 2025-05-20 PROCEDURE — 73660 X-RAY EXAM OF TOE(S): CPT

## 2025-05-20 PROCEDURE — 73630 X-RAY EXAM OF FOOT: CPT

## 2025-05-20 RX ORDER — ACETAMINOPHEN 325 MG/1
650 TABLET ORAL EVERY 6 HOURS PRN
Qty: 120 TABLET | Refills: 0 | Status: SHIPPED | OUTPATIENT
Start: 2025-05-20

## 2025-05-20 RX ORDER — IBUPROFEN 600 MG/1
600 TABLET, FILM COATED ORAL
Status: COMPLETED | OUTPATIENT
Start: 2025-05-20 | End: 2025-05-20

## 2025-05-20 RX ORDER — ACETAMINOPHEN 500 MG
1000 TABLET ORAL
Status: COMPLETED | OUTPATIENT
Start: 2025-05-20 | End: 2025-05-20

## 2025-05-20 RX ORDER — IBUPROFEN 600 MG/1
600 TABLET, FILM COATED ORAL 4 TIMES DAILY PRN
Qty: 40 TABLET | Refills: 0 | Status: SHIPPED | OUTPATIENT
Start: 2025-05-20 | End: 2025-05-22 | Stop reason: ALTCHOICE

## 2025-05-20 RX ADMIN — ACETAMINOPHEN 1000 MG: 500 TABLET ORAL at 08:55

## 2025-05-20 RX ADMIN — IBUPROFEN 600 MG: 600 TABLET, FILM COATED ORAL at 08:55

## 2025-05-20 ASSESSMENT — PAIN - FUNCTIONAL ASSESSMENT
PAIN_FUNCTIONAL_ASSESSMENT: 0-10
PAIN_FUNCTIONAL_ASSESSMENT: 0-10
PAIN_FUNCTIONAL_ASSESSMENT: INTOLERABLE, UNABLE TO DO ANY ACTIVE OR PASSIVE ACTIVITIES

## 2025-05-20 ASSESSMENT — PAIN DESCRIPTION - PAIN TYPE: TYPE: ACUTE PAIN

## 2025-05-20 ASSESSMENT — PAIN DESCRIPTION - DESCRIPTORS: DESCRIPTORS: ACHING

## 2025-05-20 ASSESSMENT — PAIN DESCRIPTION - FREQUENCY: FREQUENCY: CONTINUOUS

## 2025-05-20 ASSESSMENT — PAIN DESCRIPTION - ONSET: ONSET: GRADUAL

## 2025-05-20 ASSESSMENT — PAIN DESCRIPTION - ORIENTATION: ORIENTATION: LEFT

## 2025-05-20 ASSESSMENT — PAIN DESCRIPTION - LOCATION: LOCATION: FOOT

## 2025-05-20 ASSESSMENT — PAIN SCALES - GENERAL
PAINLEVEL_OUTOF10: 10
PAINLEVEL_OUTOF10: 5

## 2025-05-20 NOTE — ED PROVIDER NOTES
[facility] EMERGENCY DEPARTMENT  EMERGENCY DEPARTMENT ENCOUNTER      Pt Name: Mervin Reeves  MRN: [id number]  Birthdate 1955  Date of evaluation: 5/20/2025  Provider: Iker Mckeon MD  7:04 PM    CHIEF COMPLAINT       Chief Complaint   Patient presents with    Foot Pain         HISTORY OF PRESENT ILLNESS    Mervin Reeves is a 70 y.o. male who presents to the emergency department      70-year-old male history of hypercholesterolemia, hypertension here after car trip to [place] several days ago.  He noted initially that he felt like the shoe on his left foot was rubbing on his great toe started to have pain, so he started to wear sandals, since arriving back in [place] had severe pain in that left great toe causing him to have difficulty walking.  The worst pain and swelling was over the last 2 days however he notes that it is slightly better today as compared to yesterday, did take ibuprofen yesterday which helped a little bit but has not take anything today.  Has no history of arthritis or gout, no prior similar symptoms.  This pain is atraumatic, denies any sort of traumatic injury to the foot.  Noted that last night he had some pain radiating into his left calf as well but that has resolved.  No history of VTE, no history of heart disease or stroke, notes that he is prediabetic but not diabetic yet.  Otherwise feels well, no chest pain or shortness of breath, no abdominal pain, no dysuria, tolerating p.o. without any issues.          Nursing Notes were reviewed.    REVIEW OF SYSTEMS       Review of Systems    Except as noted above the remainder of the review of systems was reviewed and negative.       PAST MEDICAL HISTORY     Past Medical History:   Diagnosis Date    Agatston CAC score 200-399 01/2025    ca score 363 - all vessels    Asbestosis (HCC) 01/2025    on CT    Colon polyps 2006    Dr Gallego 2006; 2/17 adenoma/hyperplastic and divertics    Degenerative disc disease, lumbar

## 2025-05-20 NOTE — DISCHARGE INSTRUCTIONS
As discussed, you likely have some sort of arthritis in your great toe, this could be gout.  Please discuss this with your PCM at your earliest convenience.  Return to the ER for severe pain, difficulty breathing, inability tolerate foods mouth, new fever, any other concerning signs or symptoms.

## 2025-05-20 NOTE — ED TRIAGE NOTES
Patient arrived to ER with pain to left foot, outer aspect. Patient stated he had a long trip driving and when he got back home noticed pain and swelling to left foot. Painful to ambulate.

## 2025-05-22 ENCOUNTER — TELEPHONE (OUTPATIENT)
Facility: CLINIC | Age: 70
End: 2025-05-22

## 2025-05-22 ENCOUNTER — HOSPITAL ENCOUNTER (OUTPATIENT)
Age: 70
Discharge: HOME | End: 2025-05-22
Payer: MEDICARE

## 2025-05-22 ENCOUNTER — OFFICE VISIT (OUTPATIENT)
Facility: CLINIC | Age: 70
End: 2025-05-22

## 2025-05-22 VITALS
RESPIRATION RATE: 16 BRPM | DIASTOLIC BLOOD PRESSURE: 78 MMHG | BODY MASS INDEX: 25.58 KG/M2 | OXYGEN SATURATION: 100 % | HEART RATE: 48 BPM | HEIGHT: 67 IN | WEIGHT: 163 LBS | SYSTOLIC BLOOD PRESSURE: 134 MMHG | TEMPERATURE: 98.4 F

## 2025-05-22 DIAGNOSIS — M10.9 ACUTE GOUT INVOLVING TOE OF LEFT FOOT, UNSPECIFIED CAUSE: ICD-10-CM

## 2025-05-22 DIAGNOSIS — M10.9 ACUTE GOUT INVOLVING TOE OF LEFT FOOT, UNSPECIFIED CAUSE: Primary | ICD-10-CM

## 2025-05-22 DIAGNOSIS — M10.9 PODAGRA: ICD-10-CM

## 2025-05-22 LAB — URATE SERPL-MCNC: 6.1 MG/DL (ref 2.6–7.2)

## 2025-05-22 PROCEDURE — 36415 COLL VENOUS BLD VENIPUNCTURE: CPT

## 2025-05-22 PROCEDURE — 84550 ASSAY OF BLOOD/URIC ACID: CPT

## 2025-05-22 RX ORDER — PREDNISONE 20 MG/1
20 TABLET ORAL DAILY
Qty: 5 TABLET | Refills: 0 | Status: SHIPPED | OUTPATIENT
Start: 2025-05-22 | End: 2025-05-27

## 2025-05-22 NOTE — TELEPHONE ENCOUNTER
Patient needs advice on the side affects he's having after taking Rosuvastatin. He says the medication makes him dizzy, shaky and incoherent.  Please advise.

## 2025-05-22 NOTE — PROGRESS NOTES
70 y.o. male who presents for evaluation.    He is here to follow-up after his ER visit.    He was driving to [place] when he noted onset of pain in his left first MTP joint area.  Thought it was due to his shoes so he took that off.  On the way back, the symptoms got worse, his joint actually got swollen and warm.  He took ibuprofen and it helped.      Ended up in the emergency room on 5/20/2025, x-ray showed OA,  increased mineralization medial to the first MTP which is nonspecific but cannot exclude gout.  No trauma, prior history of gout.  He does eat a lot of shrimp and beef jerky.      Of note, he had EGD in March by Dr. Cassidy which did show gastritis.    Past Medical History:   Diagnosis Date    Agatston CAC score 200-399 01/2025    ca score 363 - all vessels    Asbestosis (HCC) 01/2025    on CT    Colon polyps 2006    Dr Gallego 2006; 2/17 adenoma/hyperplastic and divertics    Degenerative disc disease, lumbar 12/2016    degen disc disease worst L4-S1    ED (erectile dysfunction)     Gastritis 03/2025    EGD Dr Cassidy    Gout 05/2025    LEFT podagra; xrays showed degen change    H/O cardiovascular stress test 01/2024    NST low risk, ef 54%, tid 1.22    Hyperlipidemia     calculated 10 yr risk score 14.9% (12/21); declined statin and ca score    Hypertension     IFG (impaired fasting glucose)     2000s    Metabolic dysfunction-associated steatotic liver disease (MASLD) 2006    tranasaminasemia 2006 w neg US and serologies (2006); hepatic steatosis on CT (12/24); Fib-4 2.35 (1/25); Elastography F2-3 (1/25); Dr Mckinney    Pulmonary nodules     (1/19) 3mm RLL, 4mm LLL; (2/25) no change RLL/LLL, 2mm LLL - no f/u rec    SBO (small bowel obstruction) (HCC) 01/2025    Dr Phillips, conservative mgmt; sb enteritis    Tinea versicolor 12/14/2021     Current Outpatient Medications   Medication Sig    predniSONE (DELTASONE) 20 MG tablet Take 1 tablet by mouth daily for 5 days    acetaminophen (AMINOFEN) 325 MG tablet Take 2

## 2025-05-22 NOTE — ADDENDUM NOTE
Addended by: DEONNA OWEN on: 5/22/2025 12:49 PM     Modules accepted: Orders     High Dose Vitamin A Pregnancy And Lactation Text: High dose vitamin A therapy is contraindicated during pregnancy and breast feeding.

## 2025-05-22 NOTE — PROGRESS NOTES
Mervin Reeves presents today for   Chief Complaint   Patient presents with    ED FOLLOW-UP     05/20 [facility] ED: great toe pain, left       \"Have you been to the ER, urgent care clinic since your last visit?  Hospitalized since your last visit?\"    YES - When: approximately 2 days ago.  Where and Why: [facility] ED, left great toe pain.    “Have you seen or consulted any other health care providers outside of [facility] since your last visit?”    NO

## 2025-05-23 NOTE — TELEPHONE ENCOUNTER
I've never heard of these sfx with crestor    Did he stop and sx resolve?  Is he willing to retry?    If not, we can try 3rd statin as intol of lipitor w cramps  We can send prava 20

## 2025-05-23 NOTE — TELEPHONE ENCOUNTER
Patient verbalized this issue at visit on 05/22 and stated would discuss with Dr. Mena during visit. Advised patient that another statin could be prescribed and he stated he would discuss it with the doctor.    Please review and advise.

## 2025-05-23 NOTE — TELEPHONE ENCOUNTER
Spoke with patient he states he stopped Rosuvastatin because it makes him feel dizzy, shaky, and incoherent.       Please  advise if patient needs to have an alternative.

## 2025-06-16 DIAGNOSIS — I10 ESSENTIAL HYPERTENSION: ICD-10-CM

## 2025-06-16 NOTE — TELEPHONE ENCOUNTER
PCP: Jordan Mena MD    LAST OFFICE VISIT: 05/22/2025    LAST REFILL PER CHART:  Medication:dilTIAZem (TIAZAC) 180 MG extended release capsule   Ordered On:06/18/2024  Instructions:Take 1 capsule by mouth daily   Dispense:90 capsules  Refills:3    Future Appointments   Date Time Provider Department Center   12/16/2025  8:00 AM [facility] LAB VISIT [facility] DEP   12/23/2025  8:40 AM Jordan Mena MD [facility] DEP

## 2025-06-17 RX ORDER — DILTIAZEM HYDROCHLORIDE 180 MG/1
180 CAPSULE, EXTENDED RELEASE ORAL DAILY
Qty: 90 CAPSULE | Refills: 3 | Status: SHIPPED | OUTPATIENT
Start: 2025-06-17

## 2025-08-01 ENCOUNTER — TELEPHONE (OUTPATIENT)
Facility: CLINIC | Age: 70
End: 2025-08-01

## 2025-08-01 NOTE — TELEPHONE ENCOUNTER
Called and spoke to patient, found information he was requesting. Printed and mailed to patient's home address on file.    No further action required.

## 2025-08-01 NOTE — TELEPHONE ENCOUNTER
Pt states in December 2020 he had either a   MRI or CT of his back he states Dr Mena had ordered it . Patient misplaced the results and is asking for a copy of them .   I couldn't see anything in his chart please advise .